# Patient Record
Sex: FEMALE | Race: BLACK OR AFRICAN AMERICAN | Employment: PART TIME | URBAN - METROPOLITAN AREA
[De-identification: names, ages, dates, MRNs, and addresses within clinical notes are randomized per-mention and may not be internally consistent; named-entity substitution may affect disease eponyms.]

---

## 2021-02-25 ENCOUNTER — HOSPITAL ENCOUNTER (EMERGENCY)
Age: 41
Discharge: HOME | End: 2021-02-26
Attending: EMERGENCY MEDICINE
Payer: COMMERCIAL

## 2021-02-25 DIAGNOSIS — D64.9 ANEMIA, UNSPECIFIED TYPE: ICD-10-CM

## 2021-02-25 DIAGNOSIS — R42 DIZZINESS: ICD-10-CM

## 2021-02-25 DIAGNOSIS — R42 LIGHTHEADEDNESS: Primary | ICD-10-CM

## 2021-02-25 LAB
ALBUMIN SERPL-MCNC: 3.8 G/DL (ref 3.5–5)
ALBUMIN/GLOB SERPL: 1.1 {RATIO} (ref 1.1–2.2)
ALP SERPL-CCNC: 45 U/L (ref 45–117)
ALT SERPL-CCNC: 26 U/L (ref 12–78)
ANION GAP SERPL CALC-SCNC: 9 MMOL/L (ref 5–15)
AST SERPL-CCNC: 20 U/L (ref 15–37)
BASOPHILS # BLD: 0 K/UL (ref 0–0.1)
BASOPHILS NFR BLD: 1 % (ref 0–1)
BILIRUB SERPL-MCNC: 0.4 MG/DL (ref 0.2–1)
BUN SERPL-MCNC: 9 MG/DL (ref 6–20)
BUN/CREAT SERPL: 10 (ref 12–20)
CALCIUM SERPL-MCNC: 8.8 MG/DL (ref 8.5–10.1)
CHLORIDE SERPL-SCNC: 106 MMOL/L (ref 97–108)
CO2 SERPL-SCNC: 23 MMOL/L (ref 21–32)
CREAT SERPL-MCNC: 0.94 MG/DL (ref 0.55–1.02)
DIFFERENTIAL METHOD BLD: ABNORMAL
EOSINOPHIL # BLD: 0 K/UL (ref 0–0.4)
EOSINOPHIL NFR BLD: 1 % (ref 0–7)
ERYTHROCYTE [DISTWIDTH] IN BLOOD BY AUTOMATED COUNT: 20.3 % (ref 11.5–14.5)
GLOBULIN SER CALC-MCNC: 3.4 G/DL (ref 2–4)
GLUCOSE SERPL-MCNC: 128 MG/DL (ref 65–100)
HCT VFR BLD AUTO: 29.2 % (ref 35–47)
HGB BLD-MCNC: 9.1 G/DL (ref 11.5–16)
IMM GRANULOCYTES # BLD AUTO: 0 K/UL (ref 0–0.04)
IMM GRANULOCYTES NFR BLD AUTO: 0 % (ref 0–0.5)
LYMPHOCYTES # BLD: 1.1 K/UL (ref 0.8–3.5)
LYMPHOCYTES NFR BLD: 26 % (ref 12–49)
MCH RBC QN AUTO: 22.9 PG (ref 26–34)
MCHC RBC AUTO-ENTMCNC: 31.2 G/DL (ref 30–36.5)
MCV RBC AUTO: 73.4 FL (ref 80–99)
MONOCYTES # BLD: 0.4 K/UL (ref 0–1)
MONOCYTES NFR BLD: 9 % (ref 5–13)
NEUTS SEG # BLD: 2.9 K/UL (ref 1.8–8)
NEUTS SEG NFR BLD: 63 % (ref 32–75)
NRBC # BLD: 0 K/UL (ref 0–0.01)
NRBC BLD-RTO: 0 PER 100 WBC
PLATELET # BLD AUTO: 275 K/UL (ref 150–400)
PMV BLD AUTO: 10.3 FL (ref 8.9–12.9)
POTASSIUM SERPL-SCNC: 3.5 MMOL/L (ref 3.5–5.1)
PROT SERPL-MCNC: 7.2 G/DL (ref 6.4–8.2)
RBC # BLD AUTO: 3.98 M/UL (ref 3.8–5.2)
RBC MORPH BLD: ABNORMAL
SODIUM SERPL-SCNC: 138 MMOL/L (ref 136–145)
TROPONIN I SERPL-MCNC: <0.05 NG/ML
WBC # BLD AUTO: 4.4 K/UL (ref 3.6–11)

## 2021-02-25 PROCEDURE — 36415 COLL VENOUS BLD VENIPUNCTURE: CPT

## 2021-02-25 PROCEDURE — 85025 COMPLETE CBC W/AUTO DIFF WBC: CPT

## 2021-02-25 PROCEDURE — 84484 ASSAY OF TROPONIN QUANT: CPT

## 2021-02-25 PROCEDURE — 93005 ELECTROCARDIOGRAM TRACING: CPT

## 2021-02-25 PROCEDURE — 99284 EMERGENCY DEPT VISIT MOD MDM: CPT

## 2021-02-25 PROCEDURE — 80053 COMPREHEN METABOLIC PANEL: CPT

## 2021-02-25 NOTE — LETTER
[address] 
[facility] EMERGENCY DEPT 
[address] [place] [address] Fortunato Yang [address] 
[phone] Work/School Note Date: 2/25/2021 To Whom It May concern: 
 
Leti Marte was seen and treated today in the emergency room by the following provider(s): 
Attending Provider: Bashir Serra MD 
Physician Assistant: Lilly Watt. Please excuse Leti Marte from work today, February 26th, 2021. Sincerely, Lilly Donnelly

## 2021-02-26 VITALS
SYSTOLIC BLOOD PRESSURE: 115 MMHG | DIASTOLIC BLOOD PRESSURE: 57 MMHG | WEIGHT: 170 LBS | HEIGHT: 67 IN | BODY MASS INDEX: 26.68 KG/M2 | OXYGEN SATURATION: 100 % | TEMPERATURE: 97.9 F | HEART RATE: 64 BPM | RESPIRATION RATE: 18 BRPM

## 2021-02-26 LAB
AMPHET UR QL SCN: NEGATIVE
APPEARANCE UR: CLEAR
ATRIAL RATE: 77 BPM
BACTERIA URNS QL MICRO: NEGATIVE /HPF
BARBITURATES UR QL SCN: NEGATIVE
BENZODIAZ UR QL: NEGATIVE
BILIRUB UR QL: NEGATIVE
CALCULATED P AXIS, ECG09: 80 DEGREES
CALCULATED R AXIS, ECG10: 41 DEGREES
CALCULATED T AXIS, ECG11: 48 DEGREES
CANNABINOIDS UR QL SCN: NEGATIVE
COCAINE UR QL SCN: NEGATIVE
COLOR UR: NORMAL
DIAGNOSIS, 93000: NORMAL
DRUG SCRN COMMENT,DRGCM: NORMAL
EPITH CASTS URNS QL MICRO: NORMAL /LPF
GLUCOSE UR STRIP.AUTO-MCNC: NEGATIVE MG/DL
HCG UR QL: NEGATIVE
HGB UR QL STRIP: NEGATIVE
HYALINE CASTS URNS QL MICRO: NORMAL /LPF (ref 0–5)
KETONES UR QL STRIP.AUTO: NEGATIVE MG/DL
LEUKOCYTE ESTERASE UR QL STRIP.AUTO: NEGATIVE
METHADONE UR QL: NEGATIVE
NITRITE UR QL STRIP.AUTO: NEGATIVE
OPIATES UR QL: NEGATIVE
P-R INTERVAL, ECG05: 146 MS
PCP UR QL: NEGATIVE
PH UR STRIP: 5 [PH] (ref 5–8)
PROT UR STRIP-MCNC: NEGATIVE MG/DL
Q-T INTERVAL, ECG07: 386 MS
QRS DURATION, ECG06: 74 MS
QTC CALCULATION (BEZET), ECG08: 436 MS
RBC #/AREA URNS HPF: NORMAL /HPF (ref 0–5)
SP GR UR REFRACTOMETRY: 1.02 (ref 1–1.03)
UA: UC IF INDICATED,UAUC: NORMAL
UROBILINOGEN UR QL STRIP.AUTO: 0.2 EU/DL (ref 0.2–1)
VENTRICULAR RATE, ECG03: 77 BPM
WBC URNS QL MICRO: NORMAL /HPF (ref 0–4)

## 2021-02-26 PROCEDURE — 80307 DRUG TEST PRSMV CHEM ANLYZR: CPT

## 2021-02-26 PROCEDURE — 81025 URINE PREGNANCY TEST: CPT

## 2021-02-26 PROCEDURE — 81001 URINALYSIS AUTO W/SCOPE: CPT

## 2021-02-26 NOTE — ED NOTES
Discharge information reviewed by PA [facility]. Pt verbalized understanding of discharge instructions. Discharge note signed. Pt discharged without difficulty. Pt discharged in stable condition via ambulation, accompanied by self.

## 2021-02-26 NOTE — ED PROVIDER NOTES
EMERGENCY DEPARTMENT HISTORY AND PHYSICAL EXAM      Date: 2/25/2021  Patient Name: Shante Alvarado    History of Presenting Illness     Chief Complaint   Patient presents with    Dizziness     Pt states that she has been having several episodes of dizziness tonight along with some left arm pain. EMS administered 324 ASA PTA. History Provided By: Patient    HPI: Shante Alvarado, 45 y.o. female without significant PMHx, presents by EMS to the ED with cc of lightheadedness and dizziness. She reports that this evening she was driving her car when she started to feel like she was going to pass out. This caused her to pull into a parking lot. She ate some food and her symptoms resolved. She resumed driving home when they returned and she had to pull off the road again. She was eventually able to get home but notes upon arriving home she felt unsteady and that her heart rate was dropping. Thus, she called the rescue squad to bring her to the ED for evaluation. The patient does report that she has had these symptoms intermittently for some time and is actually scheduled to see a cardiologist this coming Monday to further discuss. She denies headache, nausea, vomiting, diuresis, abdominal pain, diarrhea, constipation, urinary complaint, chance of pregnancy. She does report that she was provided aspirin in route by EMS, which improved her symptoms. She reports that this happened to her one other time and she ended up having a sleep study that was inconclusive. There are no other complaints, changes, or physical findings at this time. Social Hx: Tobacco (denies), EtOH (denies), Illicit drug use (denies)     PCP: Cheryl Handley MD    No current facility-administered medications on file prior to encounter. No current outpatient medications on file prior to encounter. Past History     Past Medical History:  No past medical history on file.     Past Surgical History:  No past surgical history on file.    Family History:  No family history on file. Social History:  Social History     Tobacco Use    Smoking status: Not on file   Substance Use Topics    Alcohol use: Not on file    Drug use: Not on file       Allergies:  No Known Allergies      Review of Systems   Review of Systems   Constitutional: Negative for activity change, appetite change, chills, diaphoresis and fever. HENT: Negative for congestion, ear pain, rhinorrhea and sore throat. Respiratory: Negative for cough and shortness of breath. Cardiovascular: Negative for chest pain. Gastrointestinal: Negative for abdominal pain, constipation, diarrhea, nausea and vomiting. Genitourinary: Negative for difficulty urinating, dysuria, frequency and hematuria. Musculoskeletal: Negative for arthralgias and myalgias. Neurological: Positive for dizziness and light-headedness. Negative for seizures, syncope, weakness and headaches. All other systems reviewed and are negative. Physical Exam   Physical Exam  Vitals signs and nursing note reviewed. Constitutional:       General: She is not in acute distress. Appearance: She is well-developed. She is not diaphoretic. Comments: 45 y.o. -American appearing female    HENT:      Head: Normocephalic and atraumatic. Eyes:      General:         Right eye: No discharge. Left eye: No discharge. Extraocular Movements: Extraocular movements intact. Right eye: No nystagmus. Left eye: No nystagmus. Conjunctiva/sclera: Conjunctivae normal.      Pupils: Pupils are equal, round, and reactive to light. Neck:      Musculoskeletal: Normal range of motion and neck supple. Cardiovascular:      Rate and Rhythm: Normal rate and regular rhythm. Heart sounds: Normal heart sounds. No murmur. Pulmonary:      Effort: Pulmonary effort is normal. No respiratory distress. Breath sounds: Normal breath sounds. Skin:     General: Skin is warm and dry. Neurological:      Mental Status: She is alert and oriented to person, place, and time. Cranial Nerves: Cranial nerves are intact. No cranial nerve deficit or facial asymmetry. Sensory: Sensation is intact. Motor: Motor function is intact. Coordination: Coordination is intact. Gait: Gait is intact. Gait normal.   Psychiatric:         Behavior: Behavior normal.         Diagnostic Study Results     Labs -     Recent Results (from the past 12 hour(s))   EKG, 12 LEAD, INITIAL    Collection Time: 02/25/21  9:46 PM   Result Value Ref Range    Ventricular Rate 77 BPM    Atrial Rate 77 BPM    P-R Interval 146 ms    QRS Duration 74 ms    Q-T Interval 386 ms    QTC Calculation (Bezet) 436 ms    Calculated P Axis 80 degrees    Calculated R Axis 41 degrees    Calculated T Axis 48 degrees    Diagnosis       Normal sinus rhythm  Possible Left atrial enlargement  No previous ECGs available     CBC WITH AUTOMATED DIFF    Collection Time: 02/25/21  9:50 PM   Result Value Ref Range    WBC 4.4 3.6 - 11.0 K/uL    RBC 3.98 3.80 - 5.20 M/uL    HGB 9.1 (L) 11.5 - 16.0 g/dL    HCT 29.2 (L) 35.0 - 47.0 %    MCV 73.4 (L) 80.0 - 99.0 FL    MCH 22.9 (L) 26.0 - 34.0 PG    MCHC 31.2 30.0 - 36.5 g/dL    RDW 20.3 (H) 11.5 - 14.5 %    PLATELET 052 158 - 008 K/uL    MPV 10.3 8.9 - 12.9 FL    NRBC 0.0 0  WBC    ABSOLUTE NRBC 0.00 0.00 - 0.01 K/uL    NEUTROPHILS 63 32 - 75 %    LYMPHOCYTES 26 12 - 49 %    MONOCYTES 9 5 - 13 %    EOSINOPHILS 1 0 - 7 %    BASOPHILS 1 0 - 1 %    IMMATURE GRANULOCYTES 0 0.0 - 0.5 %    ABS. NEUTROPHILS 2.9 1.8 - 8.0 K/UL    ABS. LYMPHOCYTES 1.1 0.8 - 3.5 K/UL    ABS. MONOCYTES 0.4 0.0 - 1.0 K/UL    ABS. EOSINOPHILS 0.0 0.0 - 0.4 K/UL    ABS. BASOPHILS 0.0 0.0 - 0.1 K/UL    ABS. IMM.  GRANS. 0.0 0.00 - 0.04 K/UL    DF SMEAR SCANNED      RBC COMMENTS TARGET CELLS  PRESENT        RBC COMMENTS STOMATOCYTES  PRESENT        RBC COMMENTS ANISOCYTOSIS  1+        RBC COMMENTS MICROCYTOSIS  PRESENT METABOLIC PANEL, COMPREHENSIVE    Collection Time: 02/25/21  9:50 PM   Result Value Ref Range    Sodium 138 136 - 145 mmol/L    Potassium 3.5 3.5 - 5.1 mmol/L    Chloride 106 97 - 108 mmol/L    CO2 23 21 - 32 mmol/L    Anion gap 9 5 - 15 mmol/L    Glucose 128 (H) 65 - 100 mg/dL    BUN 9 6 - 20 MG/DL    Creatinine 0.94 0.55 - 1.02 MG/DL    BUN/Creatinine ratio 10 (L) 12 - 20      GFR est AA >60 >60 ml/min/1.73m2    GFR est non-AA >60 >60 ml/min/1.73m2    Calcium 8.8 8.5 - 10.1 MG/DL    Bilirubin, total 0.4 0.2 - 1.0 MG/DL    ALT (SGPT) 26 12 - 78 U/L    AST (SGOT) 20 15 - 37 U/L    Alk.  phosphatase 45 45 - 117 U/L    Protein, total 7.2 6.4 - 8.2 g/dL    Albumin 3.8 3.5 - 5.0 g/dL    Globulin 3.4 2.0 - 4.0 g/dL    A-G Ratio 1.1 1.1 - 2.2     TROPONIN I    Collection Time: 02/25/21  9:50 PM   Result Value Ref Range    Troponin-I, Qt. <0.05 <0.05 ng/mL   URINALYSIS W/ REFLEX CULTURE    Collection Time: 02/26/21 12:16 AM    Specimen: Urine   Result Value Ref Range    Color YELLOW/STRAW      Appearance CLEAR CLEAR      Specific gravity 1.019 1.003 - 1.030      pH (UA) 5.0 5.0 - 8.0      Protein Negative NEG mg/dL    Glucose Negative NEG mg/dL    Ketone Negative NEG mg/dL    Bilirubin Negative NEG      Blood Negative NEG      Urobilinogen 0.2 0.2 - 1.0 EU/dL    Nitrites Negative NEG      Leukocyte Esterase Negative NEG      WBC 0-4 0 - 4 /hpf    RBC 0-5 0 - 5 /hpf    Epithelial cells FEW FEW /lpf    Bacteria Negative NEG /hpf    UA:UC IF INDICATED CULTURE NOT INDICATED BY UA RESULT CNI      Hyaline cast 0-2 0 - 5 /lpf   DRUG SCREEN, URINE    Collection Time: 02/26/21 12:16 AM   Result Value Ref Range    AMPHETAMINES Negative NEG      BARBITURATES Negative NEG      BENZODIAZEPINES Negative NEG      COCAINE Negative NEG      METHADONE Negative NEG      OPIATES Negative NEG      PCP(PHENCYCLIDINE) Negative NEG      THC (TH-CANNABINOL) Negative NEG      Drug screen comment (NOTE)    HCG URINE, QL    Collection Time: 02/26/21 12:16 AM   Result Value Ref Range    HCG urine, QL Negative NEG       Radiologic Studies - none    Medical Decision Making   I am the first provider for this patient. I reviewed the vital signs, available nursing notes, past medical history, past surgical history, family history and social history. Vital Signs-Reviewed the patient's vital signs. Patient Vitals for the past 12 hrs:   Temp Pulse Resp BP SpO2   02/26/21 0142 97.9 °F (36.6 °C) 64 18 (!) 115/57 100 %   02/25/21 2140 98 °F (36.7 °C) 76 17 109/63 100 %       Records Reviewed: Nursing Notes    Provider Notes (Medical Decision Making): The patient presents to the ED with stable vital signs due to feeling intermittently faint this evening. DDx include UTI, dehydration, hypoglycemia, anemia, electrolyte anbl, renal insufficiency, vertigo, Meniere's disease, drug use/abuse, orthostatic hypotension. ED work up was without acute issue requiring intervention in the ED. The patient is slightly anemic. Discussed with patient taking an over the counter iron supplement, a multi-vitamin with iron and consuming foods high in iron. She should follow up with PCP to have this rechecked. The patient already has a scheduled for cardiology this Monday. She should keep that appointment for further work up and also follow up with her PCP. ED Course:   Initial assessment performed. The patients presenting problems have been discussed, and they are in agreement with the care plan formulated and outlined with them. I have encouraged them to ask questions as they arise throughout their visit. Critical Care Time: None    Disposition:  DISCHARGE NOTE:  2:06 AM  The pt is ready for discharge. The pt's signs, symptoms, diagnosis, and discharge instructions have been discussed and pt has conveyed their understanding. The pt is to follow up as recommended or return to ER should their symptoms worsen. Plan has been discussed and pt is in agreement. PLAN:  1. There are no discharge medications for this patient. 2.   Follow-up Information     Follow up With Specialties Details Why Contact Info    Luz Meraz MD Internal Medicine In 1 week As needed [address] [place] [address]  [phone]      Your Cardiologist   as scheduled this coming Monday         Return to ED if worse     Diagnosis     Clinical Impression:   1. Lightheadedness    2. Dizziness          Please note that this dictation was completed with "[email or web address] the computer voice recognition software. Quite often unanticipated grammatical, syntax, homophones, and other interpretive errors are inadvertently transcribed by the computer software. Please disregards these errors. Please excuse any errors that have escaped final proofreading.

## 2021-03-22 ENCOUNTER — CLINICAL SUPPORT (OUTPATIENT)
Dept: CARDIOLOGY CLINIC | Age: 41
End: 2021-03-22

## 2021-03-22 ENCOUNTER — ANCILLARY PROCEDURE (OUTPATIENT)
Dept: CARDIOLOGY CLINIC | Age: 41
End: 2021-03-22
Payer: COMMERCIAL

## 2021-03-22 VITALS
DIASTOLIC BLOOD PRESSURE: 63 MMHG | HEIGHT: 67 IN | SYSTOLIC BLOOD PRESSURE: 109 MMHG | WEIGHT: 170 LBS | BODY MASS INDEX: 26.68 KG/M2

## 2021-03-22 DIAGNOSIS — R42 DIZZINESS: Primary | ICD-10-CM

## 2021-03-22 DIAGNOSIS — R00.2 PALPITATIONS: ICD-10-CM

## 2021-03-22 PROCEDURE — 93306 TTE W/DOPPLER COMPLETE: CPT | Performed by: INTERNAL MEDICINE

## 2021-03-22 PROCEDURE — 93272 ECG/REVIEW INTERPRET ONLY: CPT | Performed by: INTERNAL MEDICINE

## 2021-03-22 PROCEDURE — 93270 REMOTE 30 DAY ECG REV/REPORT: CPT | Performed by: INTERNAL MEDICINE

## 2021-03-22 NOTE — PROGRESS NOTES
Patient received a 2 week event monitor. Instructions given verbally as well as an instruction sheet. Pt verbalized understanding.     [facility] Event Monitoring

## 2021-03-23 LAB
ECHO AO ASC DIAM: 2.45 CM
ECHO AO ROOT DIAM: 2.3 CM
ECHO AV AREA PEAK VELOCITY: 2.3 CM2
ECHO AV AREA/BSA PEAK VELOCITY: 1.2 CM2/M2
ECHO AV PEAK GRADIENT: 10.26 MMHG
ECHO AV PEAK VELOCITY: 160.19 CM/S
ECHO LA AREA 4C: 17.47 CM2
ECHO LA MAJOR AXIS: 3.06 CM
ECHO LA MINOR AXIS: 1.62 CM
ECHO LA VOL 2C: 58.86 ML (ref 22–52)
ECHO LA VOL 4C: 45.18 ML (ref 22–52)
ECHO LA VOL BP: 55.47 ML (ref 22–52)
ECHO LA VOL/BSA BIPLANE: 29.39 ML/M2 (ref 16–28)
ECHO LA VOLUME INDEX A2C: 31.19 ML/M2 (ref 16–28)
ECHO LA VOLUME INDEX A4C: 23.94 ML/M2 (ref 16–28)
ECHO LV E' LATERAL VELOCITY: 17.56 CM/S
ECHO LV E' SEPTAL VELOCITY: 16.49 CM/S
ECHO LV INTERNAL DIMENSION DIASTOLIC: 5.01 CM (ref 3.9–5.3)
ECHO LV INTERNAL DIMENSION SYSTOLIC: 3.07 CM
ECHO LV IVSD: 0.97 CM (ref 0.6–0.9)
ECHO LV MASS 2D: 169.3 G (ref 67–162)
ECHO LV MASS INDEX 2D: 89.7 G/M2 (ref 43–95)
ECHO LV POSTERIOR WALL DIASTOLIC: 0.92 CM (ref 0.6–0.9)
ECHO LVOT DIAM: 1.98 CM
ECHO LVOT PEAK GRADIENT: 5.7 MMHG
ECHO LVOT PEAK VELOCITY: 119.34 CM/S
ECHO LVOT SV: 77.1 ML
ECHO LVOT VTI: 24.99 CM
ECHO MV A VELOCITY: 49.2 CM/S
ECHO MV AREA PHT: 4.63 CM2
ECHO MV E DECELERATION TIME (DT): 163.69 MS
ECHO MV E VELOCITY: 96.94 CM/S
ECHO MV E/A RATIO: 1.97
ECHO MV E/E' LATERAL: 5.52
ECHO MV E/E' RATIO (AVERAGED): 5.7
ECHO MV E/E' SEPTAL: 5.88
ECHO MV PRESSURE HALF TIME (PHT): 47.47 MS
ECHO RV TAPSE: 2.57 CM (ref 1.5–2)

## 2021-03-24 ENCOUNTER — OFFICE VISIT (OUTPATIENT)
Dept: INTERNAL MEDICINE CLINIC | Age: 41
End: 2021-03-24
Payer: COMMERCIAL

## 2021-03-24 VITALS
RESPIRATION RATE: 19 BRPM | HEIGHT: 67 IN | WEIGHT: 181 LBS | SYSTOLIC BLOOD PRESSURE: 118 MMHG | OXYGEN SATURATION: 97 % | HEART RATE: 80 BPM | TEMPERATURE: 97.8 F | BODY MASS INDEX: 28.41 KG/M2 | DIASTOLIC BLOOD PRESSURE: 72 MMHG

## 2021-03-24 DIAGNOSIS — D50.0 IRON DEFICIENCY ANEMIA DUE TO CHRONIC BLOOD LOSS: ICD-10-CM

## 2021-03-24 DIAGNOSIS — Z76.89 ESTABLISHING CARE WITH NEW DOCTOR, ENCOUNTER FOR: Primary | ICD-10-CM

## 2021-03-24 DIAGNOSIS — N92.0 MENORRHAGIA WITH REGULAR CYCLE: ICD-10-CM

## 2021-03-24 PROCEDURE — 99203 OFFICE O/P NEW LOW 30 MIN: CPT | Performed by: INTERNAL MEDICINE

## 2021-03-24 RX ORDER — ERGOCALCIFEROL 1.25 MG/1
50000 CAPSULE ORAL
COMMUNITY
Start: 2021-03-11

## 2021-03-24 RX ORDER — ZINC GLUCONATE 10 MG
200 LOZENGE ORAL DAILY
COMMUNITY

## 2021-03-24 RX ORDER — BISMUTH SUBSALICYLATE 262 MG
1 TABLET,CHEWABLE ORAL DAILY
COMMUNITY

## 2021-03-24 NOTE — PROGRESS NOTES
Darci Salazar is a [address] [facility] female and presents with Establish Care  . Subjective:    Establish Care    Previous PCP- Dr. Rosa Ritter- labs recently nml    Pt is undergoing w/u for lightheadedness/anemia. Seeing Dr. Krysten Prince @ [facility]. W/u unrevealing. Echo done recently-nml. PMH- anemia-  Lab Results   Component Value Date/Time    WBC 4.4 2021 09:50 PM    HGB 9.1 (L) 2021 09:50 PM    HCT 29.2 (L) 2021 09:50 PM    PLATELET 324  09:50 PM    MCV 73.4 (L) 2021 09:50 PM        ?BENEDICT- tested 2 yrs ago ? mild    HTQ-l-umesowt    SH    FH mother  ~45 seizure disorder   Father alive brain aneurysm repair   9 siblings- healthy    HM  Immunizations UTD  Eye care n/a  Dental care UTD  Pap 3 yrs appt ashley        Review of Systems  Constitutional: negative for fevers, chills, anorexia and weight loss  Eyes:   negative for visual disturbance and irritation  ENT:   negative for tinnitus,sore throat,nasal congestion,ear pains. hoarseness  Respiratory:  negative for cough, hemoptysis, dyspnea,wheezing  CV:   negative for chest pain, palpitations, lower extremity edema  GI:   negative for nausea, vomiting, diarrhea, abdominal pain,melena  Musculoskel: negative for myalgias, arthralgias, back pain, muscle weakness, joint pain  Neurological:  negative for headaches, dizziness, vertigo, memory problems and gait   Behavl/Psych: negative for feelings of anxiety, depression, mood changes    Past Medical History:   Diagnosis Date    Anemia      History reviewed. No pertinent surgical history.   Social History     Socioeconomic History    Marital status: SINGLE     Spouse name: Not on file    Number of children: Not on file    Years of education: Not on file    Highest education level: Not on file   Tobacco Use    Smoking status: Never Smoker    Smokeless tobacco: Never Used   Substance and Sexual Activity    Alcohol use: Never     Frequency: Never    Drug use: Never    Sexual activity: Not Currently     Partners: Male     Birth control/protection: None     Family History   Problem Relation Age of Onset    Seizures Mother     Sleep Apnea Mother        No Known Allergies    Objective:  Visit Vitals  /72   Pulse 80   Temp 97.8 °F (36.6 °C) (Temporal)   Resp 19   Ht 5' 7\" (1.702 m)   Wt 181 lb (82.1 kg)   LMP 03/15/2021 (Exact Date)   SpO2 97%   BMI 28.35 kg/m²     Physical Exam:   General appearance - alert, well appearing, and in no distress  Mental status - alert, oriented to person, place, and time  EYE-EOMI  Neck - supple, no significant adenopathy   Chest - clear to auscultation, no wheezes, rales or rhonchi, symmetric air entry   Heart - normal rate, regular rhythm, normal S1, S2  Abdomen - soft, nontender, nondistended, no masses or organomegaly  Ext-peripheral pulses normal, no pedal edema, no clubbing or cyanosis  Skin-Warm and dry.  no hyperpigmentation, vitiligo, or suspicious lesions  Neuro -alert, oriented, normal speech, no focal findings or movement disorder noted        Results for orders placed or performed in visit on 03/22/21   ECHO ADULT COMPLETE   Result Value Ref Range    IVSd 0.97 (A) 0.60 - 0.90 cm    LVIDd 5.01 3.90 - 5.30 cm    LVIDs 3.07 cm    LVOT d 1.98 cm    LVPWd 0.92 (A) 0.60 - 0.90 cm    LVOT Peak Gradient 5.70 mmHg    LVOT SV 77.1 mL    LVOT Peak Velocity 119.34 cm/s    LVOT VTI 24.99 cm    Left Atrium Major Axis 3.06 cm    LA Volume 55.47 22.0 - 52.0 mL    LA Area 4C 17.47 cm2    LA Vol 2C 58.86 (A) 22.00 - 52.00 mL    LA Vol 4C 45.18 22.00 - 52.00 mL    Aortic Valve Area by Continuity of Peak Velocity 2.30 cm2    AoV PG 10.26 mmHg    Aortic Valve Systolic Peak Velocity 492.09 cm/s    MV A Abdelrahman 49.20 cm/s    Mitral Valve E Wave Deceleration Time 163.69 ms    MV E Abdelrahman 96.94 cm/s    E/E' lateral 5.52     E/E' septal 5.88     LV E' Lateral Velocity 17.56 cm/s    LV E' Septal Velocity 16.49 cm/s    Mitral Valve Pressure Half-time 47.47 ms    MVA (PHT) 4.63 cm2    Tapse 2.57 (A) 1.50 - 2.00 cm    AO ASC D 2.45 cm    Ao Root D 2.30 cm    MV E/A 1.97     LV Mass .3 67.0 - 162.0 g    LV Mass AL Index 89.7 43.0 - 95.0 g/m2    E/E' ratio (averaged) 5.70     Left Atrium Minor Axis 1.62 cm    LA Vol Index 29.39 16.00 - 28.00 ml/m2    LA Vol Index 31.19 16.00 - 28.00 ml/m2    LA Vol Index 23.94 16.00 - 28.00 ml/m2    REBEL/BSA Pk Abdelrahman 1.2 cm2/m2       Assessment/Plan:    ICD-10-CM ICD-9-CM    1. Establishing care with new doctor, encounter for  Z76.89 V65.8    2. Iron deficiency anemia due to chronic blood loss  D50.0 280.0    3. Menorrhagia with regular cycle  N92.0 626.2      Orders Placed This Encounter    ergocalciferol (ERGOCALCIFEROL) 1,250 mcg (50,000 unit) capsule    ferrous sulfate (SLOW FE) 142 mg (45 mg iron) ER tablet     Sig: Take 142 mg by mouth Daily (before breakfast). Indications: anemia from inadequate iron    magnesium 250 mg tab     Sig: Take 200 mg by mouth daily. Indications: low amount of magnesium in the blood    multivitamin (ONE A DAY) tablet     Sig: Take 1 Tab by mouth daily. 1. Establishing care with new doctor, encounter for  Completed    2. Iron deficiency anemia due to chronic blood loss  Cont iron    3. Menorrhagia with regular cycle  F/u gyn    There are no Patient Instructions on file for this visit. Follow-up and Dispositions    · Return in about 1 year (around 3/24/2022) for annual PE. I have reviewed with the patient details of the assessment and plan and all questions were answered. Relevent patient education was performed. The most recent lab findings were reviewed with the patient. An After Visit Summary was printed and given to the patient.

## 2021-03-31 ENCOUNTER — OFFICE VISIT (OUTPATIENT)
Dept: CARDIOLOGY CLINIC | Age: 41
End: 2021-03-31
Payer: COMMERCIAL

## 2021-03-31 VITALS
HEART RATE: 80 BPM | OXYGEN SATURATION: 99 % | BODY MASS INDEX: 28.19 KG/M2 | RESPIRATION RATE: 15 BRPM | HEIGHT: 67 IN | DIASTOLIC BLOOD PRESSURE: 76 MMHG | WEIGHT: 179.6 LBS | SYSTOLIC BLOOD PRESSURE: 100 MMHG

## 2021-03-31 DIAGNOSIS — D50.0 IRON DEFICIENCY ANEMIA DUE TO CHRONIC BLOOD LOSS: ICD-10-CM

## 2021-03-31 DIAGNOSIS — G47.33 OSA (OBSTRUCTIVE SLEEP APNEA): ICD-10-CM

## 2021-03-31 DIAGNOSIS — R55 NEAR SYNCOPE: ICD-10-CM

## 2021-03-31 DIAGNOSIS — R00.2 HEART PALPITATIONS: Primary | ICD-10-CM

## 2021-03-31 PROCEDURE — 93000 ELECTROCARDIOGRAM COMPLETE: CPT | Performed by: INTERNAL MEDICINE

## 2021-03-31 PROCEDURE — 99204 OFFICE O/P NEW MOD 45 MIN: CPT | Performed by: INTERNAL MEDICINE

## 2021-03-31 NOTE — PROGRESS NOTES
1. Have you been to the ER, urgent care clinic since your last visit? Hospitalized since your last visit? Seen on 2/25/21    2. Have you seen or consulted any other health care providers outside of the [address] [place] [address] since your last visit? Include any pap smears or colon screening.    No.        Chief Complaint   Patient presents with    New Patient     heart palps, syncope times 3     Results     monitor results- still wearing the monitor

## 2021-03-31 NOTE — LETTER
3/31/2021 Patient: James Rae YOB: 1980 Date of Visit: 3/31/2021 Christelle Martins MD 
[address] [facility] [id number] [address] [employer] Dear Christelle Martins MD, Thank you for referring Ms. James Rae to [facility] for evaluation. My notes for this consultation are attached. If you have questions, please do not hesitate to call me. I look forward to following your patient along with you. Sincerely, Ruy Garcia MD

## 2021-03-31 NOTE — PROGRESS NOTES
[address] [place] [address]  [phone]     Subjective:      Beth Marrero is a 45 y.o. female is here for new patient consultation. She has pmhx iron deficiency anemia, now being repleted by PCP. Denies hx smoking/etoh/illegal drug use. No family hx CAD. Initially went to the ED in Feb for dizziness, near syncope. Her Hgb then was 9. She was referred to Dr Christine Motta who ordered echo and 2 week event monitor. She also f/u with PCP who started her on Iron replacement. She had labs done last Thursday from her GYN and was informed that her Hgb was up to 10.1    No further near syncope, some occasional lightheadedness, occasional palpitation. She reports hx inconclusive sleep study back in 2018 done by [facility]  And that she notices her palpitation more at night---sometimes awaken her from sleep. She is still wearing her heart monitor. The patient denies chest pain/ shortness of breath, orthopnea, PND, LE edema, syncope. Echo 3/2021  · LV: Estimated LVEF is 60 - 65%. Normal cavity size, wall thickness, systolic function (ejection fraction normal) and diastolic function. Wall motion: normal.         Patient Active Problem List    Diagnosis Date Noted    Menorrhagia with regular cycle 03/24/2021    Iron deficiency anemia due to chronic blood loss 03/24/2021      Kvng Manuel MD  Past Medical History:   Diagnosis Date    Anemia       History reviewed. No pertinent surgical history.   No Known Allergies   Family History   Problem Relation Age of Onset    Seizures Mother     Sleep Apnea Mother       Social History     Socioeconomic History    Marital status: SINGLE     Spouse name: Not on file    Number of children: Not on file    Years of education: Not on file    Highest education level: Not on file   Occupational History    Not on file   Social Needs    Financial resource strain: Not on file    Food insecurity     Worry: Not on file Inability: Not on file    Transportation needs     Medical: Not on file     Non-medical: Not on file   Tobacco Use    Smoking status: Never Smoker    Smokeless tobacco: Never Used   Substance and Sexual Activity    Alcohol use: Never     Frequency: Never    Drug use: Never    Sexual activity: Not Currently     Partners: Male     Birth control/protection: None   Lifestyle    Physical activity     Days per week: Not on file     Minutes per session: Not on file    Stress: Not on file   Relationships    Social connections     Talks on phone: Not on file     Gets together: Not on file     Attends Zoroastrian service: Not on file     Active member of club or organization: Not on file     Attends meetings of clubs or organizations: Not on file     Relationship status: Not on file    Intimate partner violence     Fear of current or ex partner: Not on file     Emotionally abused: Not on file     Physically abused: Not on file     Forced sexual activity: Not on file   Other Topics Concern    Not on file   Social History Narrative    Not on file      Current Outpatient Medications   Medication Sig    ergocalciferol (ERGOCALCIFEROL) 1,250 mcg (50,000 unit) capsule Take 50,000 Units by mouth every seven (7) days.  ferrous sulfate (SLOW FE) 142 mg (45 mg iron) ER tablet Take 142 mg by mouth two (2) times a day. Indications: anemia from inadequate iron    magnesium 250 mg tab Take 200 mg by mouth daily. Indications: low amount of magnesium in the blood    multivitamin (ONE A DAY) tablet Take 1 Tab by mouth daily. No current facility-administered medications for this visit.           Review of Symptoms:  11 systems reviewed, negative other than as stated in the HPI    Physical ExamPhysical Exam:    Vitals:    03/31/21 1016 03/31/21 1032 03/31/21 1033   BP: 118/80 110/80 100/76   Pulse: 64 74 80   Resp: 15     SpO2: 100% 99% 99%   Weight: 179 lb 9.6 oz (81.5 kg)     Height: 5' 7\" (1.702 m)       Body mass index is 28.13 kg/m². General PE  Gen:  NAD  Mental Status - Alert. General Appearance - Not in acute distress. HEENT:  PERRL, no carotid bruits or JVD  Chest and Lung Exam   Inspection: Accessory muscles - No use of accessory muscles in breathing. Auscultation:   Breath sounds: - Normal.   Cardiovascular   Inspection: Jugular vein - Bilateral - Inspection Normal.   Palpation/Percussion:   Apical Impulse: - Normal.   Auscultation: Rhythm - Regular. Heart Sounds - S1 WNL and S2 WNL. No S3 or S4. Murmurs & Other Heart Sounds: Auscultation of the heart reveals - No Murmurs. Peripheral Vascular   Upper Extremity: Inspection - Bilateral - No Cyanotic nailbeds or Digital clubbing. Lower Extremity:   Palpation: Edema - Bilateral - No edema. Abdomen:   Soft, non-tender, bowel sounds are active. Neuro: A&O times 3, CN and motor grossly WNL    Labs:   No results found for: CHOL, CHOLX, CHLST, CHOLV, 922642, HDL, HDLP, LDL, LDLC, DLDLP, TGLX, TRIGL, TRIGP, CHHD, CHHDX  No results found for: CPK, CPKX, CPX  Lab Results   Component Value Date/Time    Sodium 138 02/25/2021 09:50 PM    Potassium 3.5 02/25/2021 09:50 PM    Chloride 106 02/25/2021 09:50 PM    CO2 23 02/25/2021 09:50 PM    Anion gap 9 02/25/2021 09:50 PM    Glucose 128 (H) 02/25/2021 09:50 PM    BUN 9 02/25/2021 09:50 PM    Creatinine 0.94 02/25/2021 09:50 PM    BUN/Creatinine ratio 10 (L) 02/25/2021 09:50 PM    GFR est AA >60 02/25/2021 09:50 PM    GFR est non-AA >60 02/25/2021 09:50 PM    Calcium 8.8 02/25/2021 09:50 PM    Bilirubin, total 0.4 02/25/2021 09:50 PM    Alk. phosphatase 45 02/25/2021 09:50 PM    Protein, total 7.2 02/25/2021 09:50 PM    Albumin 3.8 02/25/2021 09:50 PM    Globulin 3.4 02/25/2021 09:50 PM    A-G Ratio 1.1 02/25/2021 09:50 PM    ALT (SGPT) 26 02/25/2021 09:50 PM       EKG:  SR     Assessment:     Assessment:      1. Heart palpitations    2. Near syncope    3. Iron deficiency anemia due to chronic blood loss    4.  BENEDICT (obstructive sleep apnea)        Orders Placed This Encounter    YON Dillon ref ED [facility]     Referral Priority:   Routine     Referral Type:   Consultation     Referral Reason:   Specialty Services Required     Referred to Provider:   Ria Feliciano MD     Requested Specialty:   Sleep Medicine     Number of Visits Requested:   1    AMB POC EKG ROUTINE W/ 12 LEADS, INTER & REP     Order Specific Question:   Reason for Exam:     Answer:   routine        Plan:     1. Heart palpitations  Echo 3/2021 showed normal EF no significant valve issues  Will refer to Sleep center as she had hx inconclusive sleep study prev and that her palpitation are more pronounced at night  Wait completion of event monitor    2. Pre syncope  No further syncope  Echo and monitor as above  Bp normotensive. 3. Iron deficiency anemia due to chronic blood loss  Now on Iron replacement  Reports most recent Hgb 10.1 obtained last week through her GYN          Continue current care and f/u in       HOME [facility]    3/31/2021         Patient seen, examined by me personally. Plan discussed as detailed. Agree with note as outlined by  NP with modifications as noted. My independent physical exam reveals : Physical Exam   Constitutional: She is oriented to person, place, and time. She appears well-developed and well-nourished. HENT:   Head: Normocephalic. Eyes: Conjunctivae are normal.   Neck: Normal range of motion. Cardiovascular: Normal rate, regular rhythm and normal heart sounds. No murmur heard. Pulmonary/Chest: Effort normal and breath sounds normal. She has no wheezes. Musculoskeletal:         General: No edema. Neurological: She is alert and oriented to person, place, and time. Skin: Skin is warm and dry. Psychiatric: She has a normal mood and affect. Nursing note and vitals reviewed. No additional findings noted. Agree with plan as outlined above with modifications as noted.      Tre Steve Tyler Gomez MD

## 2021-04-13 NOTE — PROGRESS NOTES
Jeanette,    Please call the patient and inform that holter/event monitor is normal.  Baseline normal heart rhythm and heart rates, had some episodes of fast heart rates. Right now, would keep up with sleep evaluation and correction of her anemia.   Can consider medications to slow down the heart rate in the future if symptoms persist.    Thanks,  Augie Kumar

## 2021-04-14 ENCOUNTER — TELEPHONE (OUTPATIENT)
Dept: CARDIOLOGY CLINIC | Age: 41
End: 2021-04-14

## 2021-04-14 NOTE — TELEPHONE ENCOUNTER
----- Message from Jillian Serrato NP sent at 4/13/2021  8:23 AM EDT -----  Elva Alva,    Please call the patient and inform that holter/event monitor is normal.  Baseline normal heart rhythm and heart rates, had some episodes of fast heart rates. Right now, would keep up with sleep evaluation and correction of her anemia.   Can consider medications to slow down the heart rate in the future if symptoms persist.    Thanks,  Christy Peterson

## 2021-12-17 ENCOUNTER — OFFICE VISIT (OUTPATIENT)
Dept: INTERNAL MEDICINE CLINIC | Age: 41
End: 2021-12-17
Payer: COMMERCIAL

## 2021-12-17 VITALS — HEIGHT: 67 IN | WEIGHT: 204 LBS | RESPIRATION RATE: 16 BRPM | BODY MASS INDEX: 32.02 KG/M2

## 2021-12-17 DIAGNOSIS — M22.2X2 PATELLOFEMORAL ARTHRALGIA OF BOTH KNEES: Primary | ICD-10-CM

## 2021-12-17 DIAGNOSIS — M22.2X1 PATELLOFEMORAL ARTHRALGIA OF BOTH KNEES: Primary | ICD-10-CM

## 2021-12-17 PROCEDURE — 99214 OFFICE O/P EST MOD 30 MIN: CPT | Performed by: FAMILY MEDICINE

## 2021-12-17 RX ORDER — NAPROXEN 500 MG/1
500 TABLET ORAL 2 TIMES DAILY WITH MEALS
Qty: 30 TABLET | Refills: 1 | Status: SHIPPED | OUTPATIENT
Start: 2021-12-17

## 2021-12-17 NOTE — PROGRESS NOTES
Chief Complaint   Patient presents with    Knee Pain     she is a 37y.o. year old female who presents for evaluation of both knees  Pain Assessment Encounter      Alex Strickland  12/17/2021  Onset of Symptoms: Patient states she has had pain for 2 months  ________________________________________________________________________  Description:Patient states she was jumping rope and notices swelling in both knees    Frequency: more than 5 times a day  Pain Scale:(1-10): 4  Trauma Hx: none  Hx of similar symptoms: No:   Radiation: NO, knee  Duration:  continuous      Progression: has worsened  What makes it better?: heat and OTC meds  What makes it worse?:exercise  Medications tried: ibuprofen    Reviewed and agree with Nurse Note and duplicated in this note. Reviewed PmHx, RxHx, FmHx, SocHx, AllgHx and updated and dated in the chart. Family History   Problem Relation Age of Onset    Seizures Mother     Sleep Apnea Mother        Past Medical History:   Diagnosis Date    Anemia       Social History     Socioeconomic History    Marital status: SINGLE   Tobacco Use    Smoking status: Never Smoker    Smokeless tobacco: Never Used   Vaping Use    Vaping Use: Never used   Substance and Sexual Activity    Alcohol use: Never    Drug use: Never    Sexual activity: Not Currently     Partners: Male     Birth control/protection: None        Review of Systems - negative except as listed above      Objective:     Vitals:    12/17/21 1047   Resp: 16   Weight: 204 lb (92.5 kg)   Height: 5' 7\" (1.702 m)       Physical Examination: General appearance - alert, well appearing, and in no distress  Back exam - full range of motion, no tenderness, palpable spasm or pain on motion  Neurological - alert, oriented, normal speech, no focal findings or movement disorder noted  Musculoskeletal - bilateral knee exam:  The patient'sbilateral Knee  is  normal to inspection.   The ROM is normal and there is flexion to Normal Effusion is: mild The joint exhibits Negative warmth and Crepitus is: moderate. The Santy test is:  negative Joint Line Tenderness is  positive medial.  The Thessaly Test is negative  and the Lachman is  negative. The Anterior Drawer is negative. The Posterior Drawer is:  negative. Valgus Stress (for MCL) is:  normal . Varus Stress (for LCL) is  normal  . The Price Test is negative and the Apprehension Sign:  negative  Patellar Grind positive and Tracking is lateralizing  Extremities - peripheral pulses normal, no pedal edema, no clubbing or cyanosis  Skin - normal coloration and turgor, no rashes, no suspicious skin lesions noted   Indications for study: right knee pain  Limited musculoskeletal ultrasound examination was performed on the anterior right knee with the following findings: Quadriceps tendon - long:  normal echogenic, linearly compact fibrillar appearance   Quadriceps tendon - trans:  normal echogenic, tessellate compact fibrillar pancake-like appearance   Suprapatellar recess - long: + effusion; normal appearing suprapatellar fat pads   Patellar tendon - long:  normal echogenic, linearly compact fibrillar appearance; normal appearing Hoffa fat pad  Patellar tendon - trans:  normal echogenic, tessellate compact fibrillar pancake-like appearance     Impression: Moderate knee effusion otherwise normal    Scanned and Interpreted by Shahbaz Galan MD [facility]        Assessment/ Plan:   Diagnoses and all orders for this visit:    1. Patellofemoral arthralgia of both knees  -     XR KNEE RT MIN 4 V; Future  -     REFERRAL TO PHYSICAL THERAPY  -     AMB POC US,EXTREMITY,NONVASCULAR,REAL-TIME IMAGE,LIMITED    Other orders  -     naproxen (NAPROSYN) 500 mg tablet; Take 1 Tablet by mouth two (2) times daily (with meals).          Pathophysiology, recovery and rehabilitation process discussed and questions answered   Counseling for 30 Minutes of the total visit duration   Pictures and figures used as necessary Provided reassurance   Monitor response to Physical Therapy   Recommend activity modification   Recommend  lower impact activities-walking, Eliptical, Nordic Track, cycling or swimming   Follow up in 4 week(s)             1) Remember to stay active and/or exercise regularly (I suggest 30-45 minutes daily)   2) For reliable dietary information, go to www. EATRIGHT.org. You may wish to consider seeing the nutritionist at [facility] [phone], also consider the [id number] I have discussed the diagnosis with the patient and the intended plan as seen in the above orders. The patient has received an after-visit summary and questions were answered concerning future plans. Medication Side Effects and Warnings were discussed with patient,  Patient Labs were reviewed and or requested, and  Patient Past Records were reviewed and or requested  yes      Pt agrees to call or return to clinic and/or go to closest ER with any worsening of symptoms. This may include, but not limited to increased fever (>100.4) with NSAIDS or Tylenol, increased edema, confusion, rash, worsening of presenting symptoms. Please note that this dictation was completed with [employer], the computer voice recognition software. Quite often unanticipated grammatical, syntax, homophones, and other interpretive errors are inadvertently transcribed by the computer software. Please disregard these errors. Please excuse any errors that have escaped final proofreading. Thank you.

## 2022-01-05 ENCOUNTER — APPOINTMENT (OUTPATIENT)
Dept: PHYSICAL THERAPY | Age: 42
End: 2022-01-05

## 2022-01-07 ENCOUNTER — HOSPITAL ENCOUNTER (OUTPATIENT)
Dept: PHYSICAL THERAPY | Age: 42
Discharge: HOME | End: 2022-01-07
Payer: COMMERCIAL

## 2022-01-07 PROCEDURE — 97161 PT EVAL LOW COMPLEX 20 MIN: CPT | Performed by: PHYSICAL THERAPIST

## 2022-01-07 NOTE — PROGRESS NOTES
PT INITIAL EVALUATION NOTE 2-15    Patient Name: Chapito Kaye  Date:2022  : 1980  [x]  Patient  Verified  [facility] / Plan: [address] / Product Type: Managed Care Medicaid /    In time:1023a  Out time: 1055a  Total Treatment Time (min): 32  Visit #: 1     Treatment Area: Bilateral knee pain [M25.561, M25.562]    SUBJECTIVE  Pain Level (0-10 scale): 0  Any medication changes, allergies to medications, adverse drug reactions, diagnosis change, or new procedure performed?: [] No    [x] Yes (see summary sheet for update)  Subjective:     Patient reports with bilateral knee pain that began after jumping rope for the first time in about 20 years. Knees were painful for a few days then went away. She tried it again recently in early December, and had the same reaction. She is mainly sticking to the elliptical for exercise right now, but also walks the track and notices that the pain will increase in she is on the treadmill for >1 hour. Notices an increase in knee pain with bend down, going down>up stairs (R>L) knees, standing >1 hour. R knee is worse than L knee. Description of symptoms: R>L knee pain, anterio-inferior region  Aggravating Factors: see above  Alleviating Factors: arnica gel, pillows underneath, heat    OBJECTIVE/EXAMINATION  Posture:  Genu valgum, recurvatum noted  Other Observations:  Squat: forward knee position, pain and crepitus    Palpation: Some TTP over infrapatellar fat pad bilaterally, R patellar tendon    L Knee AROM 5/0/125deg   R Knee AROM 5/0/118deg p!      Joint Mobility Assessment: Patella: lateral tilt bilaterally, patella clary on R     Flexibility: HS flexibility good, Quad flexibility notably tight, R>L sides (~95deg)    LOWER QUARTER   MUSCLE STRENGTH  KEY       R  L  0 - No Contraction  Knee ext  5  5  1 - Trace            flex  5  5  2 - Poor   Hip ext   --  --  3 - Fair          flex   4+  4+  4 - Good abd  3+  4  5 - Normal         ER   4  4+    MMT: see above  Neurological: Reflexes / Sensations: nt  Special Tests: Ralph: (+) bilaterally                    Patellar grind: (+)    5 min Therapeutic Exercise:  [x] See flow sheet :   Rationale: increase ROM and increase strength to improve the patients ability to exercise without pain          With   [] TE   [] TA   [] Neuro   [] SC   [] other: Patient Education: [x] Review HEP    [] Progressed/Changed HEP based on:   [] positioning   [] body mechanics   [] transfers   [] heat/ice application    [] other:        Other Objective/Functional Measures: FOTO Functional Measure: 69/100    Pain Level (0-10 scale) post treatment: 0    ASSESSMENT:      [x]  See Plan of MANAV Sanon, MARYT 1/7/2022

## 2022-01-07 NOTE — PROGRESS NOTES
Physical Therapy at [facility],   a part of [facility]  [address] [address], [address] [place] [address], [address]  Phone: [phone]  Fax: [phone]    Plan of Care/Statement of Necessity for Physical Therapy Services  2-15    Patient name: Matt Rosario  : 1980  Provider#: [id number]  Referral source: José Miguel Leyva MD      Medical/Treatment Diagnosis: Bilateral knee pain [M25.561, M25.562]     Prior Hospitalization: see medical history     Comorbidities: obesity  Prior Level of Function: able to exercise without pain  Medications: Verified on Patient Summary List    Start of Care: 23      Onset Date: 2-3 months ago       The Plan of Care and following information is based on the information from the initial evaluation. Assessment/ key information: Patient reports with bilateral knee pain that began after beginning a jump rope workout. Symptoms consistent with bilateral patello-femoral pain syndrome. Noted excessive tightness of both quads and hip flexors, hip weakness, and patellar mal-alignment.      Evaluation Complexity History MEDIUM  Complexity : 1-2 comorbidities / personal factors will impact the outcome/ POC ; Examination HIGH Complexity : 4+ Standardized tests and measures addressing body structure, function, activity limitation and / or participation in recreation  ;Presentation LOW Complexity : Stable, uncomplicated  ;Clinical Decision Making MEDIUM Complexity : FOTO score of 26-74  Overall Complexity Rating: LOW     Problem List: pain affecting function, decrease ROM, decrease strength, impaired gait/ balance, decrease ADL/ functional abilitiies, decrease activity tolerance and decrease flexibility/ joint mobility   Treatment Plan may include any combination of the following: Therapeutic exercise, Therapeutic activities, Neuromuscular re-education, Physical agent/modality, Manual therapy, Patient education and Self Care training  Patient / Family readiness to learn indicated by: asking questions and trying to perform skills  Persons(s) to be included in education: patient (P)  Barriers to Learning/Limitations: None  Patient Goal (s): strengthen knees to be able to continue exercise  Patient Self Reported Health Status: excellent  Rehabilitation Potential: good    Short Term Goals: To be accomplished in 2-4 treatments:   1. Pt will be compliant with initial HEP and PT attendance  Long Term Goals: To be accomplished in 10-12 treatments:   1. Pt will be able to walk for at least 1 hour without increase in symptoms   2. Pt will be able to perform light jump rope exercises without increase in symptoms   3. Pt will be able to navigate stairs without increase in symptoms   4. Pt will be able to self-manage care using updated HEP for improved independence   5. Improved FOTO score to 75 or better to demonstrate improved function  Frequency / Duration: Patient to be seen 1-2 times per week for 10-12 treatments. Patient/ Caregiver education and instruction: self care and exercises    [x]  Plan of care has been reviewed with EVELYNE Barnett PT, DPT 1/7/2022     ________________________________________________________________________    I certify that the above Therapy Services are being furnished while the patient is under my care. I agree with the treatment plan and certify that this therapy is necessary.     [de-identified] Signature:____________________  Date:____________Time: _________      Florin Petersen MD

## 2022-01-12 ENCOUNTER — HOSPITAL ENCOUNTER (OUTPATIENT)
Dept: PHYSICAL THERAPY | Age: 42
Discharge: HOME | End: 2022-01-12
Payer: COMMERCIAL

## 2022-01-12 PROCEDURE — 97110 THERAPEUTIC EXERCISES: CPT | Performed by: PHYSICAL THERAPIST

## 2022-01-12 PROCEDURE — 97140 MANUAL THERAPY 1/> REGIONS: CPT | Performed by: PHYSICAL THERAPIST

## 2022-01-12 NOTE — PROGRESS NOTES
PT DAILY TREATMENT NOTE 2-15    Patient Name: Laura Suarez  Date:2022  : 1980  [x]  Patient  Verified  [facility] [address] / Plan: [address] / Product Type: Managed Care Medicaid /    In time: 117p  Out time: 207p  Total Treatment Time (min): 50  Visit #: 2    Treatment Area: Bilateral knee pain [M25.561, M25.562]    SUBJECTIVE  Pain Level (0-10 scale): 0  Any medication changes, allergies to medications, adverse drug reactions, diagnosis change, or new procedure performed?: [x] No    [] Yes (see summary sheet for update)  Subjective functional status/changes:   [] No changes reported  Knee felt much better doing the exercises at home. Noticed immediate improvements with stairs after doing the exercises. OBJECTIVE    40 min Therapeutic Exercise:  [x] See flow sheet :   Rationale: increase ROM and increase strength to improve the patients ability to work without pain    10 min Manual Therapy:  Patellar glides, lateral HS stretching (not effective on R), both performed bilaterally. IT band foam roll MFR on R   Rationale: decrease pain, increase ROM, increase tissue extensibility and decrease trigger points  to improve the patients ability to navigate stairs without pain          With   [] TE   [] TA   [] Neuro   [] SC   [] other: Patient Education: [x] Review HEP    [] Progressed/Changed HEP based on:   [] positioning   [] body mechanics   [] transfers   [] heat/ice application    [] other:      Other Objective/Functional Measures: --     Pain Level (0-10 scale) post treatment: 0    ASSESSMENT/Changes in Function:   Did well with exercises today, but was significantly challenged with hip strengthening exercises. Good initial progress.    Patient will continue to benefit from skilled PT services to modify and progress therapeutic interventions, address functional mobility deficits, address ROM deficits, address strength deficits, analyze and address soft tissue restrictions and analyze and cue movement patterns to attain remaining goals. []  See Plan of Care  []  See progress note/recertification  []  See Discharge Summary         Progress towards goals / Updated goals:    Short Term Goals: To be accomplished in 2-4 treatments:               1. Pt will be compliant with initial HEP and PT attendance MET THUS FAR  Long Term Goals:  To be accomplished in 10-12 treatments:               1. Pt will be able to walk for at least 1 hour without increase in symptoms               2. Pt will be able to perform light jump rope exercises without increase in symptoms               3. Pt will be able to navigate stairs without increase in symptoms               4. Pt will be able to self-manage care using updated HEP for improved independence               5. Improved FOTO score to 75 or better to demonstrate improved function    PLAN  [x]  Upgrade activities as tolerated     [x]  Continue plan of care  []  Update interventions per flow sheet       []  Discharge due to:_  []  Other:_      Jonathan Santacruz, PT, DPT 1/12/2022

## 2022-01-14 ENCOUNTER — HOSPITAL ENCOUNTER (OUTPATIENT)
Dept: PHYSICAL THERAPY | Age: 42
Discharge: HOME | End: 2022-01-14
Payer: COMMERCIAL

## 2022-01-14 PROCEDURE — 97110 THERAPEUTIC EXERCISES: CPT | Performed by: PHYSICAL THERAPIST

## 2022-01-14 PROCEDURE — 97140 MANUAL THERAPY 1/> REGIONS: CPT | Performed by: PHYSICAL THERAPIST

## 2022-01-14 NOTE — PROGRESS NOTES
PT DAILY TREATMENT NOTE 2-15    Patient Name: Home Hendricks  Date:2022  : 1980  [x]  Patient  Verified  [facility] [address] [address] / Plan: [address] / Product Type: Managed Care Medicaid /    In time: 1210p  Out time: 100p  Total Treatment Time (min): 50  Visit #:  3    Treatment Area: Bilateral knee pain [M25.561, M25.562]    SUBJECTIVE  Pain Level (0-10 scale): 1  Any medication changes, allergies to medications, adverse drug reactions, diagnosis change, or new procedure performed?: [x] No    [] Yes (see summary sheet for update)  Subjective functional status/changes:   [] No changes reported  Reports she was sore after last visit in her knees, but only lasted about the rest of the day. OBJECTIVE      40 min Therapeutic Exercise:  [x]? See flow sheet :   Rationale: increase ROM and increase strength to improve the patients ability to work without pain     10 min Manual Therapy:  Patellar glides. IT band foam roll MFR bilaterally   Rationale: decrease pain, increase ROM, increase tissue extensibility and decrease trigger points  to improve the patients ability to navigate stairs without pain                                                                 With   []? TE   []? TA   []? Neuro   []? SC   []? other: Patient Education: [x]? Review HEP    []? Progressed/Changed HEP based on:   []? positioning   []? body mechanics   []? transfers   []? heat/ice application    []? other:       Other Objective/Functional Measures: --        Pain Level (0-10 scale) post treatment: 0     ASSESSMENT/Changes in Function:   Able to perform 6\" step up without pain today on each side with verbal cueing for hip ER positioning. Held on leg press today.     Patient will continue to benefit from skilled PT services to modify and progress therapeutic interventions, address functional mobility deficits, address ROM deficits, address strength deficits, analyze and address soft tissue restrictions and analyze and cue movement patterns to attain remaining goals. []? See Plan of Care  []? See progress note/recertification  []? See Discharge Summary         Progress towards goals / Updated goals:     Short Term Goals: To be accomplished in 2-4 treatments:               1. Pt will be compliant with initial HEP and PT attendance MET [address] be accomplished in 10-12 treatments:               1. Pt will be able to walk for at least 1 hour without increase in symptoms               2. Pt will be able to perform light jump rope exercises without increase in symptoms               3. Pt will be able to navigate stairs without increase in symptoms               4. Pt will be able to self-manage care using updated HEP for improved independence               5. Improved FOTO score to 75 or better to demonstrate improved function     PLAN  [x]? Upgrade activities as tolerated     [x]? Continue plan of care  []? Update interventions per flow sheet       []? Discharge due to:_  []?   Other:_        Keysha Calvo, PT, DPT 1/14/2022

## 2022-01-17 ENCOUNTER — APPOINTMENT (OUTPATIENT)
Dept: PHYSICAL THERAPY | Age: 42
End: 2022-01-17
Payer: COMMERCIAL

## 2022-01-19 ENCOUNTER — HOSPITAL ENCOUNTER (OUTPATIENT)
Dept: PHYSICAL THERAPY | Age: 42
Discharge: HOME | End: 2022-01-19
Payer: COMMERCIAL

## 2022-01-19 PROCEDURE — 97110 THERAPEUTIC EXERCISES: CPT | Performed by: PHYSICAL THERAPIST

## 2022-01-19 PROCEDURE — 97140 MANUAL THERAPY 1/> REGIONS: CPT | Performed by: PHYSICAL THERAPIST

## 2022-01-19 NOTE — PROGRESS NOTES
PT DAILY TREATMENT NOTE - [facility] 2-15    Patient Name: Lance Hussein  Date:2022  : 1980  [x]  Patient  Verified  Payor: [address] / Plan: [address] / Product Type: Managed Care Medicaid /    In time: 718E  Out time:405p  Total Treatment Time (min): 50  Visit #: 4    Treatment Area: Bilateral knee pain [M25.561, M25.562]    SUBJECTIVE  Pain Level (0-10 scale): 0  Any medication changes, allergies to medications, adverse drug reactions, diagnosis change, or new procedure performed?: [x] No    [] Yes (see summary sheet for update)  Subjective functional status/changes:   [] No changes reported  Knees are starting to feel better. OBJECTIVE  40 min Therapeutic Exercise:  [x]? ? See flow sheet :   Rationale: increase ROM and increase strength to improve the patients ability to work without pain     10 min Manual Therapy:  Patellar glides. IT band foam roll MFR bilaterally   Rationale: decrease pain, increase ROM, increase tissue extensibility and decrease trigger points  to improve the patients ability to navigate stairs without pain                                                                 With   []?? TE   []?? TA   []? ? Neuro   []?? SC   []?? other: Patient Education: [x]? ? Review HEP    []? ? Progressed/Changed HEP based on:   []?? positioning   []? ? body mechanics   []? ? transfers   []? ? heat/ice application    []? ? other:       Other Objective/Functional Measures: --        Pain Level (0-10 scale) post treatment: 0     ASSESSMENT/Changes in Function:   Added back in leg press and step ups today without pain. Making good progress at this time. Patient will continue to benefit from skilled PT services to modify and progress therapeutic interventions, address functional mobility deficits, address ROM deficits, address strength deficits, analyze and address soft tissue restrictions and analyze and cue movement patterns to attain remaining goals.     []? ?  See Plan of Care  []? ?  See progress note/recertification  []? ?  See Discharge Summary         Progress towards goals / Updated goals:     Short Term Goals: To be accomplished in 2-4 treatments:               1. Pt will be compliant with initial HEP and PT attendance [address] be accomplished in 10-12 treatments:               1. Pt will be able to walk for at least 1 hour without increase in symptoms               2. Pt will be able to perform light jump rope exercises without increase in symptoms               3. Pt will be able to navigate stairs without increase in symptoms               4. Pt will be able to self-manage care using updated HEP for improved independence               5. Improved FOTO score to 75 or better to demonstrate improved function     PLAN  [x]? ?  Upgrade activities as tolerated     [x]? ?  Continue plan of care  []? ?  Update interventions per flow sheet       []? ?  Discharge due to:_  []??  Other:_          Hector Chilel, PT, DPT 1/19/2022

## 2022-01-24 ENCOUNTER — OFFICE VISIT (OUTPATIENT)
Dept: INTERNAL MEDICINE CLINIC | Age: 42
End: 2022-01-24
Payer: COMMERCIAL

## 2022-01-24 VITALS
HEART RATE: 64 BPM | OXYGEN SATURATION: 94 % | WEIGHT: 199.5 LBS | DIASTOLIC BLOOD PRESSURE: 67 MMHG | HEIGHT: 67 IN | BODY MASS INDEX: 31.31 KG/M2 | SYSTOLIC BLOOD PRESSURE: 103 MMHG

## 2022-01-24 DIAGNOSIS — M22.2X2 PATELLOFEMORAL ARTHRALGIA OF BOTH KNEES: Primary | ICD-10-CM

## 2022-01-24 DIAGNOSIS — M22.2X1 PATELLOFEMORAL ARTHRALGIA OF BOTH KNEES: Primary | ICD-10-CM

## 2022-01-24 PROCEDURE — 99214 OFFICE O/P EST MOD 30 MIN: CPT | Performed by: FAMILY MEDICINE

## 2022-01-24 NOTE — PROGRESS NOTES
Chief Complaint   Patient presents with    Knee Pain     bilateral knee pain follow up     she is a 37y.o. year old female who presents for follow up of injury. Follow Up Pain Assessment Encounter      Onset of Symptoms: 4 months  ________________________________________________________________________  Description: Pain is now has improved      Pain Scale:(1-10): 2 currently and 10/10 at its worse  Duration:  continuous  Radiation: none  What makes it better?: heat, sitting, strecthing and pain gel  What makes it worse?:bending knee, going up stairs, certain types of exercises. Medications tried: pain gel  Modalities tried: PT         Reviewed and agree with Nurse Note and duplicated in this note. Reviewed PmHx, RxHx, FmHx, SocHx, AllgHx and updated and dated in the chart.     Family History   Problem Relation Age of Onset    Seizures Mother     Sleep Apnea Mother        Past Medical History:   Diagnosis Date    Anemia       Social History     Socioeconomic History    Marital status: SINGLE   Tobacco Use    Smoking status: Never Smoker    Smokeless tobacco: Never Used   Vaping Use    Vaping Use: Never used   Substance and Sexual Activity    Alcohol use: Never    Drug use: Never    Sexual activity: Not Currently     Partners: Male     Birth control/protection: None        Review of Systems - negative except as listed above      Objective:     Vitals:    01/24/22 1456   BP: 103/67   Pulse: 64   SpO2: 94%   Weight: 199 lb 8 oz (90.5 kg)   Height: 5' 7\" (1.702 m)       Physical Examination: General appearance - alert, well appearing, and in no distress  Eyes - pupils equal and reactive, extraocular eye movements intact  Ears - bilateral TM's and external ear canals normal  Nose - normal and patent, no erythema, discharge or polyps  Mouth - mucous membranes moist, pharynx normal without lesions  Neck - supple, no significant adenopathy  Chest - clear to auscultation, no wheezes, rales or rhonchi, symmetric air entry  Heart - normal rate, regular rhythm, normal S1, S2, no murmurs, rubs, clicks or gallops  Abdomen - soft, nontender, nondistended, no masses or organomegaly  Musculoskeletal - no joint tenderness, deformity or swelling  Extremities - peripheral pulses normal, no pedal edema, no clubbing or cyanosis  Skin - normal coloration and turgor, no rashes, no suspicious skin lesions noted     Assessment/ Plan:   Diagnoses and all orders for this visit:    1. Patellofemoral arthralgia of both knees       At this time patient like to avoid steroid injections  Continue physical therapy and over-the-counter topical treatment such as Voltaren. Follow-up in 3 weeks  Pathophysiology, recovery and rehabilitation process discussed and questions answered   Counseling for 30 Minutes of the total visit duration   Pictures and figures used as necessary   Provided reassurance   Monitor response to injection   Monitor response to Physical Therapy   Recommend activity modification   Recommend  lower impact activities-walking, Eliptical, Nordic Track, cycling or swimming               I have discussed the diagnosis with the patient and the intended plan as seen in the above orders. The patient has received an after-visit summary and questions were answered concerning future plans. Medication Side Effects and Warnings were discussed with patient,  Patient Labs were reviewed and or requested, and  Patient Past Records were reviewed and or requested  yes     Pt agrees to call or return to clinic and/or go to closest ER with any worsening of symptoms. This may include, but not limited to increased fever (>100.4) with NSAIDS or Tylenol, increased edema, confusion, rash, worsening of presenting symptoms. Please note that this dictation was completed with [employer], the [employer] voice recognition software.   Quite often unanticipated grammatical, syntax, homophones, and other interpretive errors are inadvertently transcribed by the computer software. Please disregard these errors. Please excuse any errors that have escaped final proofreading. Thank you.

## 2022-01-26 ENCOUNTER — HOSPITAL ENCOUNTER (OUTPATIENT)
Dept: PHYSICAL THERAPY | Age: 42
Discharge: HOME | End: 2022-01-26
Payer: COMMERCIAL

## 2022-01-26 PROCEDURE — 97110 THERAPEUTIC EXERCISES: CPT | Performed by: PHYSICAL THERAPIST

## 2022-01-26 PROCEDURE — 97140 MANUAL THERAPY 1/> REGIONS: CPT | Performed by: PHYSICAL THERAPIST

## 2022-01-26 NOTE — PROGRESS NOTES
PT DAILY TREATMENT NOTE 2-15    Patient Name: Magdy Weir  Date:2022  : 1980  [x]  Patient  Verified  Payor: [address] / Plan: [address] / Product Type: Managed Care Medicaid /    In time: 771X  Out time: 415p  Total Treatment Time (min): 62  Visit #:  5    Treatment Area: Bilateral knee pain [M25.561, M25.562]    SUBJECTIVE  Pain Level (0-10 scale): 0  Any medication changes, allergies to medications, adverse drug reactions, diagnosis change, or new procedure performed?: [x] No    [] Yes (see summary sheet for update)  Subjective functional status/changes:   [] No changes reported  Doing pretty well today. Overall the knees seem to be improving some, and had less pain last session than the session prior with the squats. OBJECTIVE  43 min Therapeutic Exercise:  [x]? ?? See flow sheet :   Rationale: increase ROM and increase strength to improve the patients ability to work without pain     14 min Manual Therapy:  Patellar glides. IT band foam roll MFR bilaterally   Rationale: decrease pain, increase ROM, increase tissue extensibility and decrease trigger points  to improve the patients ability to navigate stairs without pain                                                                 With   []??? TE   []??? TA   []??? Neuro   []??? SC   []? ?? other: Patient Education: [x]??? Review HEP    []? ?? Progressed/Changed HEP based on:   []??? positioning   []? ?? body mechanics   []? ?? transfers   []? ?? heat/ice application    []? ?? other:       Other Objective/Functional Measures: --        Pain Level (0-10 scale) post treatment: 0     ASSESSMENT/Changes in Function:   Doing well with exercises at this time. Added in hip ER without pain, but seems to be improving.    Patient will continue to benefit from skilled PT services to modify and progress therapeutic interventions, address functional mobility deficits, address ROM deficits, address strength deficits, analyze and address soft tissue restrictions and analyze and cue movement patterns to attain remaining goals.     []? ??  See Plan of Care  []? ??  See progress note/recertification  []? ??  See Discharge Summary         Progress towards goals / Updated goals:     Short Term Goals: To be accomplished in 2-4 treatments:               1. Pt will be compliant with initial HEP and PT attendance [address] be accomplished in 10-12 treatments:               1. Pt will be able to walk for at least 1 hour without increase in symptoms               2. Pt will be able to perform light jump rope exercises without increase in symptoms               3. Pt will be able to navigate stairs without increase in symptoms               4. Pt will be able to self-manage care using updated HEP for improved independence               5. Improved FOTO score to 75 or better to demonstrate improved function     PLAN  [x]???  Upgrade activities as tolerated     [x]? ??  Continue plan of care  []? ??  Update interventions per flow sheet       []???  Discharge due to:_  []???  Other:Alek Trinidad , PT, DPT 1/26/2022

## 2022-01-28 ENCOUNTER — HOSPITAL ENCOUNTER (OUTPATIENT)
Dept: PHYSICAL THERAPY | Age: 42
Discharge: HOME | End: 2022-01-28
Payer: COMMERCIAL

## 2022-01-28 PROCEDURE — 97140 MANUAL THERAPY 1/> REGIONS: CPT | Performed by: PHYSICAL THERAPIST

## 2022-01-28 PROCEDURE — 97110 THERAPEUTIC EXERCISES: CPT | Performed by: PHYSICAL THERAPIST

## 2022-01-28 NOTE — PROGRESS NOTES
PT DAILY TREATMENT NOTE 2-15    Patient Name: Jayashree Alejandre  Date:2022  : 1980  [x]  Patient  Verified  Payor: [address] / Plan: [address] / Product Type: Managed Care Medicaid /    In time: 9974U  Out time: 100p  Total Treatment Time (min): 47  Visit #: 6    Treatment Area: Bilateral knee pain [M25.561, M25.562]    SUBJECTIVE  Pain Level (0-10 scale): 0  Any medication changes, allergies to medications, adverse drug reactions, diagnosis change, or new procedure performed?: [x] No    [] Yes (see summary sheet for update)  Subjective functional status/changes:   [] No changes reported  Doing fine today. No knee pain after last visit. OBJECTIVE  38 min Therapeutic Exercise:  [x]? ??? See flow sheet :   Rationale: increase ROM and increase strength to improve the patients ability to work without pain     9 min Manual Therapy:  Patellar glides. IT band foam roll MFR bilaterally   Rationale: decrease pain, increase ROM, increase tissue extensibility and decrease trigger points  to improve the patients ability to navigate stairs without pain                                                                 With   []???? TE   []???? TA   []???? Neuro   []???? SC   []???? other: Patient Education: [x]???? Review HEP    []???? Progressed/Changed HEP based on:   []???? positioning   []???? body mechanics   []???? transfers   []???? heat/ice application    []???? other:       Other Objective/Functional Measures: --        Pain Level (0-10 scale) post treatment: 0     ASSESSMENT/Changes in Function:   Added valgus pull for step up and this improved her form. Modified this for her to work on at home.    Patient will continue to benefit from skilled PT services to modify and progress therapeutic interventions, address functional mobility deficits, address ROM deficits, address strength deficits, analyze and address soft tissue restrictions and analyze and cue movement patterns to attain remaining goals.     []????  See Plan of Care  []????  See progress note/recertification  []????  See Discharge Summary         Progress towards goals / Updated goals:     Short Term Goals: To be accomplished in 2-4 treatments:               1. Pt will be compliant with initial HEP and PT attendance 75% MET  Long Term Goals: To be accomplished in 10-12 treatments:               1. Pt will be able to walk for at least 1 hour without increase in symptoms               2. Pt will be able to perform light jump rope exercises without increase in symptoms               3. Pt will be able to navigate stairs without increase in symptoms               4. Pt will be able to self-manage care using updated HEP for improved independence               5. Improved FOTO score to 75 or better to demonstrate improved function     PLAN  [x]????  Upgrade activities as tolerated     [x]? ???  Continue plan of care  []????  Update interventions per flow sheet       []????  Discharge due to:_  []????  Other:_        Topher Deluna, PT, DPT 1/28/2022

## 2022-02-07 ENCOUNTER — HOSPITAL ENCOUNTER (OUTPATIENT)
Dept: PHYSICAL THERAPY | Age: 42
Discharge: HOME | End: 2022-02-07
Payer: COMMERCIAL

## 2022-02-07 PROCEDURE — 97140 MANUAL THERAPY 1/> REGIONS: CPT | Performed by: PHYSICAL THERAPIST

## 2022-02-07 PROCEDURE — 97110 THERAPEUTIC EXERCISES: CPT | Performed by: PHYSICAL THERAPIST

## 2022-02-07 NOTE — PROGRESS NOTES
PT DAILY TREATMENT NOTE 2-15    Patient Name: Joe Cervantes  MPXM:[id number]  : 1980  [x]  Patient  Verified  Payor: [address] / Plan: [address] / Product Type: Managed Care Medicaid /    In time: 402p  Out time: 500p  Total Treatment Time (min): 58  Visit #: 7    Treatment Area: Bilateral knee pain [M25.561, M25.562]     SUBJECTIVE  Pain Level (0-10 scale): 0  Any medication changes, allergies to medications, adverse drug reactions, diagnosis change, or new procedure performed?: [x] No    [] Yes (see summary sheet for update)  Subjective functional status/changes:   [] No changes reported  80% recovered at this time. OBJECTIVE    48 min Therapeutic Exercise:  [x]?????? See flow sheet :   Rationale: increase ROM and increase strength to improve the patients ability to work without pain     10 min Manual Therapy:  Patellar glides. IT band foam roll MFR bilaterally   Rationale: decrease pain, increase ROM, increase tissue extensibility and decrease trigger points  to improve the patients ability to navigate stairs without pain                                                                 With   []?????? TE   []?????? TA   []?????? Neuro   []?????? SC   []?????? other: Patient Education: [x]?????? Review HEP    []?????? Progressed/Changed HEP based on:   []?????? positioning   []?????? body mechanics   []?????? transfers   []?????? heat/ice application    []?????? other:       Other Objective/Functional Measures: FOTO: 75        Pain Level (0-10 scale) post treatment: 0     ASSESSMENT/Changes in Function:   []??????  See Plan of Care  [x]??????  See progress note/recertification  []??????  See Discharge Summary         Progress towards goals / Updated goals:     Short Term Goals: To be accomplished in 2-4 treatments:               1.  Pt will be compliant with initial HEP and PT attendance MET  Long Term Goals: To be accomplished in 10-12 treatments:               1. Pt will be able to walk for at least 1 hour without increase in symptoms MET               2. Pt will be able to perform light jump rope exercises without increase in symptoms NOT YET ATTEMPTED               3. Pt will be able to navigate stairs without increase in symptoms 75% MET               4. Pt will be able to self-manage care using updated HEP for improved independence MET               5.  Improved FOTO score to 75 or better to demonstrate improved function MET     PLAN  [x]??????  Upgrade activities as tolerated     [x]??????  Continue plan of care  []??????  Update interventions per flow sheet       []??????  Discharge due to:_  []??????  Other:_        Rogerio Gillk, PT, DPT 2/7/2022

## 2022-02-09 ENCOUNTER — HOSPITAL ENCOUNTER (OUTPATIENT)
Dept: PHYSICAL THERAPY | Age: 42
Discharge: HOME | End: 2022-02-09
Payer: COMMERCIAL

## 2022-02-09 PROCEDURE — 97110 THERAPEUTIC EXERCISES: CPT | Performed by: PHYSICAL THERAPIST

## 2022-02-09 PROCEDURE — 97140 MANUAL THERAPY 1/> REGIONS: CPT | Performed by: PHYSICAL THERAPIST

## 2022-02-09 NOTE — PROGRESS NOTES
PT DAILY TREATMENT NOTE 2-15    Patient Name: Chapito Kaye  Date:2022  : 1980  [x]  Patient  Verified  Payor: [address] [place] [address] Road / Plan: [address] / Product Type: Managed Care Medicaid /    In time: 450p  Out time: 539p  Total Treatment Time (min): 49  Visit #:  8    Treatment Area: Bilateral knee pain [M25.561, M25.562]    SUBJECTIVE  Pain Level (0-10 scale): 0  Any medication changes, allergies to medications, adverse drug reactions, diagnosis change, or new procedure performed?: [x] No    [] Yes (see summary sheet for update)  Subjective functional status/changes:   [] No changes reported  Doing well this week. No pain right now. OBJECTIVE     39 min Therapeutic Exercise:  [x]??????? See flow sheet :   Rationale: increase ROM and increase strength to improve the patients ability to work without pain     10 min Manual Therapy:  Patellar glides. IT band foam roll MFR bilaterally   Rationale: decrease pain, increase ROM, increase tissue extensibility and decrease trigger points  to improve the patients ability to navigate stairs without pain                                                                 With   []??????? TE   []??????? TA   []??????? Neuro   []??????? SC   []??????? other: Patient Education: [x]??????? Review HEP    []??????? Progressed/Changed HEP based on:   []??????? positioning   []??????? body mechanics   []??????? transfers   []??????? heat/ice application    []??????? other:       Other Objective/Functional Measures: --       Pain Level (0-10 scale) post treatment: 0    ASSESSMENT/Changes in Function:   Doing pretty well with jump rope. Needed green band to help limit valgus pull. She demonstrated excess anterior knee positioning with jumping, and when this was corrected, she had no pain.   Patient will continue to benefit from skilled PT services to modify and progress therapeutic interventions, address functional mobility deficits, address ROM deficits, address strength deficits, analyze and address soft tissue restrictions and analyze and cue movement patterns to attain remaining goals. []  See Plan of Care  []  See progress note/recertification  []  See Discharge Summary         Progress towards goals / Updated goals:  Short Term Goals: To be accomplished in 2-4 treatments:               1. Pt will be compliant with initial HEP and PT attendance MET  Long Term Goals: To be accomplished in 10-12 treatments:               1. Pt will be able to walk for at least 1 hour without increase in symptoms MET               2. Pt will be able to perform light jump rope exercises without increase in symptoms NOT YET ATTEMPTED               3. Pt will be able to navigate stairs without increase in symptoms 75% MET               4. Pt will be able to self-manage care using updated HEP for improved independence MET               5.  Improved FOTO score to 75 or better to demonstrate improved function MET    PLAN  [x]  Upgrade activities as tolerated     [x]  Continue plan of care  []  Update interventions per flow sheet       []  Discharge due to:_  []  Other:_      Keysha Calvo, PT, DPT 2/9/2022

## 2022-02-14 ENCOUNTER — HOSPITAL ENCOUNTER (OUTPATIENT)
Dept: PHYSICAL THERAPY | Age: 42
Discharge: HOME | End: 2022-02-14
Payer: COMMERCIAL

## 2022-02-14 PROCEDURE — 97016 VASOPNEUMATIC DEVICE THERAPY: CPT | Performed by: PHYSICAL THERAPIST

## 2022-02-14 PROCEDURE — 97110 THERAPEUTIC EXERCISES: CPT | Performed by: PHYSICAL THERAPIST

## 2022-02-14 PROCEDURE — 97140 MANUAL THERAPY 1/> REGIONS: CPT | Performed by: PHYSICAL THERAPIST

## 2022-02-14 NOTE — PROGRESS NOTES
PT DAILY TREATMENT NOTE 2-15    Patient Name: Chelsie Menchaca  Date:2022  : 1980  [x]  Patient  Verified  Payor: [facility] [address] / Plan: [address] / Product Type: Managed Care Medicaid /    In time: 400p  Out time: 505p  Total Treatment Time (min): 65  Visit #: 9    Treatment Area: Bilateral knee pain [M25.561, M25.562]    SUBJECTIVE  Pain Level (0-10 scale): 3  Any medication changes, allergies to medications, adverse drug reactions, diagnosis change, or new procedure performed?: [x] No    [] Yes (see summary sheet for update)  Subjective functional status/changes:   [] No changes reported  Was painful the day after last PT session. Pain has lingered since. She also was having some swelling, but not as bad as it was when symptoms started last year.      OBJECTIVE  Modality rationale: decrease edema, decrease inflammation and decrease pain to improve the patients ability to jump rope without pain   Min Type Additional Details       [] Estim: []Att   []Unatt    []TENS instruct                  []IFC  []Premod   []NMES                     []Other:  []w/US   []w/ice   []w/heat  Position:  Location:       []  Traction: [] Cervical       []Lumbar                       [] Prone          []Supine                       []Intermittent   []Continuous Lbs:  [] before manual  [] after manual  []w/heat    []  Ultrasound: []Continuous   [] Pulsed                       at: []1MHz   []3MHz Location:  W/cm2:    [] Paraffin         Location:   []w/heat    []  Ice     []  Heat  []  Ice massage Position:  Location:    []  Laser  []  Other: Position:  Location:     10 [x]  Vasopneumatic Device Pressure:       [] lo [x] med [] hi   Temperature: 34deg     [x] Skin assessment post-treatment:  [x]intact []redness- no adverse reaction    []redness  adverse reaction:     45 min Therapeutic Exercise:  [x]???????? See flow sheet :   Rationale: increase ROM and increase strength to improve the patients ability to work without pain     10 min Manual Therapy:  Patellar glides R. IT band foam roll MFR bilaterally   Rationale: decrease pain, increase ROM, increase tissue extensibility and decrease trigger points  to improve the patients ability to navigate stairs without pain                                                                 With   []???????? TE   []???????? TA   []???????? Neuro   []???????? SC   []???????? other: Patient Education: [x]???????? Review HEP    []???????? Progressed/Changed HEP based on:   []???????? positioning   []???????? body mechanics   []???????? transfers   []???????? heat/ice application    []???????? other:       Other Objective/Functional Measures: --       Pain Level (0-10 scale) post treatment: 0     ASSESSMENT/Changes in Function:   Responded well to vasopneumatic compression today. No pain on TG squats, and will slowly try to re-integrate light plyometrics. Patient will continue to benefit from skilled PT services to modify and progress therapeutic interventions, address functional mobility deficits, address ROM deficits, address strength deficits, analyze and address soft tissue restrictions and analyze and cue movement patterns to attain remaining goals. []? See Plan of Care  []? See progress note/recertification  []? See Discharge Summary         Progress towards goals / Updated goals:  Short Term Goals: To be accomplished in 2-4 treatments:               1. Pt will be compliant with initial HEP and PT attendance MET  Long Term Goals: To be accomplished in 10-12 treatments:               1.  Pt will be able to walk for at least 1 hour without increase in symptoms MET               2. Pt will be able to perform light jump rope exercises without increase in symptoms NOT YET ATTEMPTED               3. Pt will be able to navigate stairs without increase in symptoms 75% MET               4. Pt will be able to self-manage care using updated HEP for improved independence MET               5. Improved FOTO score to 75 or better to demonstrate improved function MET     PLAN  [x]? Upgrade activities as tolerated     [x]? Continue plan of care  []? Update interventions per flow sheet       []? Discharge due to:_  []?   Other:_         Issac Dillard, PT, DPT 2/14/2022

## 2022-02-15 ENCOUNTER — OFFICE VISIT (OUTPATIENT)
Dept: INTERNAL MEDICINE CLINIC | Age: 42
End: 2022-02-15
Payer: COMMERCIAL

## 2022-02-15 VITALS
HEIGHT: 67 IN | BODY MASS INDEX: 30.76 KG/M2 | OXYGEN SATURATION: 98 % | RESPIRATION RATE: 16 BRPM | HEART RATE: 70 BPM | TEMPERATURE: 98 F | WEIGHT: 196 LBS | DIASTOLIC BLOOD PRESSURE: 81 MMHG | SYSTOLIC BLOOD PRESSURE: 123 MMHG

## 2022-02-15 DIAGNOSIS — G89.29 CHRONIC PAIN OF RIGHT KNEE: Primary | ICD-10-CM

## 2022-02-15 DIAGNOSIS — M25.561 CHRONIC PAIN OF RIGHT KNEE: Primary | ICD-10-CM

## 2022-02-15 DIAGNOSIS — M22.2X2 PATELLOFEMORAL ARTHRALGIA OF BOTH KNEES: ICD-10-CM

## 2022-02-15 DIAGNOSIS — M22.2X1 PATELLOFEMORAL ARTHRALGIA OF BOTH KNEES: ICD-10-CM

## 2022-02-15 PROCEDURE — 99214 OFFICE O/P EST MOD 30 MIN: CPT | Performed by: FAMILY MEDICINE

## 2022-02-15 NOTE — PROGRESS NOTES
Chief Complaint   Patient presents with    Knee Pain     Patient is here for a follow up of both knees. she is a 37y.o. year old female who presents for follow up of injury. Follow Up Pain Assessment Encounter      Onset of Symptoms: Patient states she has had pain for months   ________________________________________________________________________  Description: Pain is now  is unchanged      Pain Scale:(1-10): 5  Duration:  continuous  Radiation: knee  What makes it better?: heat and OTC meds  What makes it worse?:exercise, strecthing and walking  Medications tried: ibuprofen  Modalities tried: PT         Reviewed and agree with Nurse Note and duplicated in this note. Reviewed PmHx, RxHx, FmHx, SocHx, AllgHx and updated and dated in the chart. Family History   Problem Relation Age of Onset    Seizures Mother     Sleep Apnea Mother        Past Medical History:   Diagnosis Date    Anemia       Social History     Socioeconomic History    Marital status: SINGLE   Tobacco Use    Smoking status: Never Smoker    Smokeless tobacco: Never Used   Vaping Use    Vaping Use: Never used   Substance and Sexual Activity    Alcohol use: Never    Drug use: Never    Sexual activity: Not Currently     Partners: Male     Birth control/protection: None        Review of Systems - negative except as listed above      Objective:     Vitals:    02/15/22 1608   BP: 123/81   Pulse: 70   Resp: 16   Temp: 98 °F (36.7 °C)   SpO2: 98%   Weight: 196 lb (88.9 kg)   Height: 5' 7\" (1.702 m)       Physical Examination: General appearance - alert, well appearing, and in no distress  Back exam - full range of motion, no tenderness, palpable spasm or pain on motion  Neurological - alert, oriented, normal speech, no focal findings or movement disorder noted  Musculoskeletal - bilateral knee exam:  The patient'sbilateral Knee  is  normal to inspection.   The ROM is normal and there is flexion to Normal Effusion is: mild The joint exhibits Negative warmth and Crepitus is: moderate. The Santy test is:  negative Joint Line Tenderness is  positive medial.  The Thessaly Test is negative  and the Lachman is  negative. The Anterior Drawer is negative. The Posterior Drawer is:  negative. Valgus Stress (for MCL) is:  normal . Varus Stress (for LCL) is  normal  . The Price Test is negative and the Apprehension Sign:  negative  Patellar Grind positive and Tracking is lateralizing  Extremities - peripheral pulses normal, no pedal edema, no clubbing or cyanosis  Skin - normal coloration and turgor, no rashes, no suspicious skin lesions noted      Assessment/ Plan:   Diagnoses and all orders for this visit:    1. Chronic pain of right knee  -     MRI KNEE RT WO CONT; Future    2. Patellofemoral arthralgia of both knees    MRI due to failure therapy with worsening symptoms on right knee, concern for OCD lesion  Possible therapies are steroid injection versus HA injection versus PRP    Pathophysiology, recovery and rehabilitation process discussed and questions answered   Counseling for 30 Minutes of the total visit duration   Pictures and figures used as necessary   Provided reassurance   Monitor response to Physical Therapy   Recommend activity modification   Recommend  lower impact activities-walking, Eliptical, Nordic Track, cycling or swimming               I have discussed the diagnosis with the patient and the intended plan as seen in the above orders. The patient has received an after-visit summary and questions were answered concerning future plans. Medication Side Effects and Warnings were discussed with patient,  Patient Labs were reviewed and or requested, and  Patient Past Records were reviewed and or requested  yes     Pt agrees to call or return to clinic and/or go to closest ER with any worsening of symptoms.   This may include, but not limited to increased fever (>100.4) with NSAIDS or Tylenol, increased edema, confusion, rash, worsening of presenting symptoms. Please note that this dictation was completed with "[employer] the computer voice recognition software. Quite often unanticipated grammatical, syntax, homophones, and other interpretive errors are inadvertently transcribed by the computer software. Please disregard these errors. Please excuse any errors that have escaped final proofreading. Thank you.

## 2022-02-16 ENCOUNTER — HOSPITAL ENCOUNTER (OUTPATIENT)
Dept: PHYSICAL THERAPY | Age: 42
Discharge: HOME | End: 2022-02-16
Payer: COMMERCIAL

## 2022-02-16 PROCEDURE — 97016 VASOPNEUMATIC DEVICE THERAPY: CPT | Performed by: PHYSICAL THERAPIST

## 2022-02-16 PROCEDURE — 97110 THERAPEUTIC EXERCISES: CPT | Performed by: PHYSICAL THERAPIST

## 2022-02-16 PROCEDURE — 97140 MANUAL THERAPY 1/> REGIONS: CPT | Performed by: PHYSICAL THERAPIST

## 2022-02-16 NOTE — PROGRESS NOTES
PT DAILY TREATMENT NOTE 2-15    Patient Name: Abraham Mendez  Date:2022  : 1980  [x]  Patient  Verified  Payor: [address] [place] [address] Road / Plan: [address] / Product Type: Managed Care Medicaid /    In time: 267P  Out time: 509p  Total Treatment Time (min): 68  Visit #:  10    Treatment Area: Bilateral knee pain [M25.561, M25.562]    SUBJECTIVE  Pain Level (0-10 scale): 2  Any medication changes, allergies to medications, adverse drug reactions, diagnosis change, or new procedure performed?: [x] No    [] Yes (see summary sheet for update)  Subjective functional status/changes:   [] No changes reported  Knee has been doing better since last visit. Ice felt good on it. Saw Dr. Adriana Dhillon and is planning on getting an MRI for the knee. OBJECTIVE         Modality rationale: decrease edema, decrease inflammation and decrease pain to improve the patients ability to jump rope without pain    Min Type Additional Details        []? Estim: []? Att   []? Unatt    []? TENS instruct                  []?IFC  []? Premod   []? NMES                     []?Other:  []?w/US   []?w/ice   []?w/heat  Position:  Location:        []? Traction: []? Cervical       []? Lumbar                       []? Prone          []? Supine                       []?Intermittent   []? Continuous Lbs:  []? before manual  []? after manual  []?w/heat     []? Ultrasound: []? Continuous   []? Pulsed                       at: []?1MHz   []? 3MHz Location:  W/cm2:     []? Paraffin         Location:   []?w/heat     []? Ice     []? Heat  []? Ice massage Position:  Location:     []? Laser  []? Other: Position:  Location:   10 [x]? Vasopneumatic Device Pressure:       []? lo [x]? med []? hi   Temperature: 34deg      [x]? Skin assessment post-treatment:  [x]? intact []? redness- no adverse reaction    []? redness  adverse reaction:      48 min Therapeutic Exercise:  [x]????????? See flow sheet :   Rationale: increase ROM and increase strength to improve the patients ability to work without pain     10 min Manual Therapy:  Patellar glides R. IT band foam roll MFR bilaterally   Rationale: decrease pain, increase ROM, increase tissue extensibility and decrease trigger points  to improve the patients ability to navigate stairs without pain                                                                 With   []????????? TE   []????????? TA   []????????? Neuro   []????????? SC   []????????? other: Patient Education: [x]????????? Review HEP    []????????? Progressed/Changed HEP based on:   []????????? positioning   []????????? body mechanics   []????????? transfers   []????????? heat/ice application    []????????? other:       Other Objective/Functional Measures: --       Pain Level (0-10 scale) post treatment: 0     ASSESSMENT/Changes in Function:   Able to perform TG single limb squats and trampoline hops today without pain. Will continue with 2 more sessions prior to MRI. Patient will continue to benefit from skilled PT services to modify and progress therapeutic interventions, address functional mobility deficits, address ROM deficits, address strength deficits, analyze and address soft tissue restrictions and analyze and cue movement patterns to attain remaining goals.     []? ?  See Plan of Care  []? ?  See progress note/recertification  []? ?  See Discharge Summary         Progress towards goals / Updated goals:  Short Term Goals: To be accomplished in 2-4 treatments:               1. Pt will be compliant with initial HEP and PT attendance MET  Long Term Goals: To be accomplished in 10-12 treatments:               1.  Pt will be able to walk for at least 1 hour without increase in symptoms MET               2. Pt will be able to perform light jump rope exercises without increase in symptoms NOT YET ATTEMPTED               3. Pt will be able to navigate stairs without increase in symptoms 75% MET               4. Pt will be able to self-manage care using updated HEP for improved independence MET               5. Improved FOTO score to 75 or better to demonstrate improved function MET     PLAN  [x]? ?  Upgrade activities as tolerated     [x]? ?  Continue plan of care  []? ?  Update interventions per flow sheet       []? ?  Discharge due to:_  []??  Other:_         Morenita Marcial, PT, DPT 2/16/2022

## 2022-02-21 ENCOUNTER — OFFICE VISIT (OUTPATIENT)
Dept: INTERNAL MEDICINE CLINIC | Age: 42
End: 2022-02-21
Payer: COMMERCIAL

## 2022-02-21 VITALS
HEART RATE: 63 BPM | TEMPERATURE: 98.2 F | WEIGHT: 197.4 LBS | BODY MASS INDEX: 30.98 KG/M2 | HEIGHT: 67 IN | SYSTOLIC BLOOD PRESSURE: 104 MMHG | RESPIRATION RATE: 20 BRPM | OXYGEN SATURATION: 100 % | DIASTOLIC BLOOD PRESSURE: 70 MMHG

## 2022-02-21 DIAGNOSIS — E66.9 OBESITY (BMI 30.0-34.9): ICD-10-CM

## 2022-02-21 DIAGNOSIS — Z00.00 PHYSICAL EXAM: Primary | ICD-10-CM

## 2022-02-21 PROCEDURE — 99396 PREV VISIT EST AGE 40-64: CPT | Performed by: INTERNAL MEDICINE

## 2022-02-21 NOTE — PROGRESS NOTES
Matt Rosario is a 37 y.o. female    Chief Complaint   Patient presents with    Establish Care     1. Have you been to the ER, urgent care clinic since your last visit? Hospitalized since your last visit? No      2. Have you seen or consulted any other health care providers outside of the [address] [place] [address] since your last visit? Include any pap smears or colon screening.   No

## 2022-02-22 LAB
ALBUMIN SERPL-MCNC: 4 G/DL (ref 3.5–5)
ALBUMIN/GLOB SERPL: 1.4 {RATIO} (ref 1.1–2.2)
ALP SERPL-CCNC: 49 U/L (ref 45–117)
ALT SERPL-CCNC: 18 U/L (ref 12–78)
ANION GAP SERPL CALC-SCNC: 2 MMOL/L (ref 5–15)
APPEARANCE UR: CLEAR
AST SERPL-CCNC: 15 U/L (ref 15–37)
BASOPHILS # BLD: 0 K/UL (ref 0–0.1)
BASOPHILS NFR BLD: 0 % (ref 0–1)
BILIRUB SERPL-MCNC: 0.4 MG/DL (ref 0.2–1)
BILIRUB UR QL: NEGATIVE
BUN SERPL-MCNC: 12 MG/DL (ref 6–20)
BUN/CREAT SERPL: 16 (ref 12–20)
CALCIUM SERPL-MCNC: 9.1 MG/DL (ref 8.5–10.1)
CHLORIDE SERPL-SCNC: 107 MMOL/L (ref 97–108)
CHOLEST SERPL-MCNC: 173 MG/DL
CO2 SERPL-SCNC: 31 MMOL/L (ref 21–32)
COLOR UR: ABNORMAL
CREAT SERPL-MCNC: 0.75 MG/DL (ref 0.55–1.02)
DIFFERENTIAL METHOD BLD: NORMAL
EOSINOPHIL # BLD: 0.2 K/UL (ref 0–0.4)
EOSINOPHIL NFR BLD: 4 % (ref 0–7)
ERYTHROCYTE [DISTWIDTH] IN BLOOD BY AUTOMATED COUNT: 13.5 % (ref 11.5–14.5)
GLOBULIN SER CALC-MCNC: 2.9 G/DL (ref 2–4)
GLUCOSE SERPL-MCNC: 93 MG/DL (ref 65–100)
GLUCOSE UR STRIP.AUTO-MCNC: NEGATIVE MG/DL
HCT VFR BLD AUTO: 37.8 % (ref 35–47)
HDLC SERPL-MCNC: 54 MG/DL
HDLC SERPL: 3.2 {RATIO} (ref 0–5)
HGB BLD-MCNC: 12.6 G/DL (ref 11.5–16)
HGB UR QL STRIP: NEGATIVE
IMM GRANULOCYTES # BLD AUTO: 0 K/UL (ref 0–0.04)
IMM GRANULOCYTES NFR BLD AUTO: 0 % (ref 0–0.5)
KETONES UR QL STRIP.AUTO: NEGATIVE MG/DL
LDLC SERPL CALC-MCNC: 89.6 MG/DL (ref 0–100)
LEUKOCYTE ESTERASE UR QL STRIP.AUTO: NEGATIVE
LYMPHOCYTES # BLD: 1.5 K/UL (ref 0.8–3.5)
LYMPHOCYTES NFR BLD: 37 % (ref 12–49)
MCH RBC QN AUTO: 29.3 PG (ref 26–34)
MCHC RBC AUTO-ENTMCNC: 33.3 G/DL (ref 30–36.5)
MCV RBC AUTO: 87.9 FL (ref 80–99)
MONOCYTES # BLD: 0.3 K/UL (ref 0–1)
MONOCYTES NFR BLD: 8 % (ref 5–13)
NEUTS SEG # BLD: 2.1 K/UL (ref 1.8–8)
NEUTS SEG NFR BLD: 51 % (ref 32–75)
NITRITE UR QL STRIP.AUTO: NEGATIVE
NRBC # BLD: 0 K/UL (ref 0–0.01)
NRBC BLD-RTO: 0 PER 100 WBC
PH UR STRIP: 8.5 [PH] (ref 5–8)
PLATELET # BLD AUTO: 222 K/UL (ref 150–400)
PMV BLD AUTO: 11.4 FL (ref 8.9–12.9)
POTASSIUM SERPL-SCNC: 3.8 MMOL/L (ref 3.5–5.1)
PROT SERPL-MCNC: 6.9 G/DL (ref 6.4–8.2)
PROT UR STRIP-MCNC: NEGATIVE MG/DL
RBC # BLD AUTO: 4.3 M/UL (ref 3.8–5.2)
SODIUM SERPL-SCNC: 140 MMOL/L (ref 136–145)
SP GR UR REFRACTOMETRY: 1.01 (ref 1–1.03)
TRIGL SERPL-MCNC: 147 MG/DL (ref ?–150)
UROBILINOGEN UR QL STRIP.AUTO: 0.2 EU/DL (ref 0.2–1)
VLDLC SERPL CALC-MCNC: 29.4 MG/DL
WBC # BLD AUTO: 4.1 K/UL (ref 3.6–11)

## 2022-02-23 ENCOUNTER — APPOINTMENT (OUTPATIENT)
Dept: PHYSICAL THERAPY | Age: 42
End: 2022-02-23
Payer: COMMERCIAL

## 2022-03-02 ENCOUNTER — HOSPITAL ENCOUNTER (OUTPATIENT)
Dept: PHYSICAL THERAPY | Age: 42
Discharge: HOME | End: 2022-03-02
Payer: COMMERCIAL

## 2022-03-02 PROCEDURE — 97016 VASOPNEUMATIC DEVICE THERAPY: CPT | Performed by: PHYSICAL THERAPIST

## 2022-03-02 PROCEDURE — 97110 THERAPEUTIC EXERCISES: CPT | Performed by: PHYSICAL THERAPIST

## 2022-03-02 PROCEDURE — 97140 MANUAL THERAPY 1/> REGIONS: CPT | Performed by: PHYSICAL THERAPIST

## 2022-03-02 NOTE — PROGRESS NOTES
Physical Therapy at [facility],   a part of [facility]  [address] [address], [address] [place] [address], [address] [place] [address]  Phone: [phone]  Fax: [phone]    Medicaid Discharge Summary  2-15    Patient name: Joe Cervantes  : 1980  Provider#: [id number]  Referral source: Arleth Do MD      Medical/Treatment Diagnosis: Bilateral knee pain [M25.561, M25.562]     Prior Hospitalization: see medical history     Comorbidities: obesity  Prior Level of Function: able to exercise without pain  Medications: Verified on Patient Summary List     Start of Care: 22                                                                       Onset Date: 2-3 months ago         Visits from Start of Care: 11    Missed Visits: 2  Reporting Period : 22 to 3/2/22      ASSESSMENT/SUMMARY OF CARE: Ms. Shannan Jacobo has made good progress over the course of 11 sessions treating her R>L knee pain. She has made good progress during that time, and is now able to navigate a single flight of stairs without discomfort. She still has not been able to perform full jump roping exercises without pain, but otherwise is able to exercise. Will attain MRI to check for possible any osteochondral defects, and will proceed with independent management of care at this time. Short Term Goals: To be accomplished in 2-4 treatments:               1. Pt will be compliant with initial HEP and PT attendance MET  Long Term Goals: To be accomplished in 10-12 treatments:               1. Pt will be able to walk for at least 1 hour without increase in symptoms MET               2. Pt will be able to perform light jump rope exercises without increase in symptoms PARTIAL PROGRESS               3. Pt will be able to navigate stairs without increase in symptoms MET               4. Pt will be able to self-manage care using updated HEP for improved independence MET               5.  Improved FOTO score to 75 or better to demonstrate improved function MET    RECOMMENDATIONS:  [x]Discontinue therapy: [x]Patient has reached or is progressing toward set goals/end of insurance authorization      []Patient is non-compliant or has abdicated      []Due to lack of appreciable progress towards set [address] Perkins PT, DPT 3/2/2022     ______________________________________________________________________    NOTE TO PHYSICIAN:  Please complete the following and fax to:  Physical Therapy at [facility], a part of [facility]: Fax: [phone] . Rene Hernandez Retain this original for your records. If you are unable to process this request in 24 hours, please contact our office.      Physician's Signature:____________________  Date:____________Time:_________           Shilpa Anderson MD

## 2022-03-02 NOTE — PROGRESS NOTES
PT DAILY TREATMENT NOTE 2-15    Patient Name: Amanda Class  Date:3/2/2022  : 1980  [x]  Patient  Ver[facility]  [facility] [address] / Plan: [address] / Product Type: Managed Care Medicaid /    In time: 341J  Out time: 415p  Total Treatment Time (min): 58  Visit #:  11    Treatment Area: Bilateral knee pain [M25.561, M25.562]    SUBJECTIVE  Pain Level (0-10 scale): 0  Any medication changes, allergies to medications, adverse drug reactions, diagnosis change, or new procedure performed?: [x] No    [] Yes (see summary sheet for update)  Subjective functional status/changes:   [] No changes reported  Knee has been doing well since last visit. Has been doing most everything except her jump rope. OBJECTIVE              Modality rationale: decrease edema, decrease inflammation and decrease pain to improve the patients ability to jump rope without pain     Min Type Additional Details        []? ? Estim: []??Att   []? ? Unatt    []? ?TENS instruct                  []? ?IFC  []? ?Premod   []? ?NMES                     []? ? Other:  []??w/US   []? ?w/ice   []? ?w/heat  Position:  Location:        []? ?  Traction: []?? Cervical       []? ?Lumbar                       []? ? Prone          []? ?Supine                       []? ?Intermittent   []? ? Continuous Lbs:  []?? before manual  []? ? after manual  []? ?w/heat     []? ?  Ultrasound: []??Continuous   []? ? Pulsed                       OC: []??1MHz   []? ? 3MHz Location:  W/cm2:     []? ? Paraffin         Location:   []??w/heat     []? ?  Ice     []? ?  Heat  []? ?  Ice massage Position:  Location:     []? ?  Laser  []? ?  Other: Position:  Location:   10 [x]? ?  Vasopneumatic Device Pressure:       []? ? lo [x]? ? med []?? hi   Temperature: 34deg      [x]? ? Skin assessment post-treatment:  [x]? ? intact []? ?redness- no adverse reaction    []? ?redness - adverse reaction:      38 min Therapeutic Exercise:  [x]?????????? See flow sheet :   Rationale: increase ROM and increase strength to improve the patients ability to work without pain     10 min Manual Therapy:  Patellar glides R, Mayorga tape applied to improve patellar mechanics   Rationale: decrease pain, increase ROM, increase tissue extensibility and decrease trigger points  to improve the patients ability to navigate stairs without pain                                                                 With   []?????????? TE   []?????????? TA   []?????????? Neuro   []?????????? SC   []?????????? other: Patient Education: [x]?????????? Review HEP    []?????????? Progressed/Changed HEP based on:   []?????????? positioning   []?????????? body mechanics   []?????????? transfers   []?????????? heat/ice application    []?????????? other:       Other Objective/Functional Measures: --       Pain Level (0-10 scale) post treatment: 0     ASSESSMENT/Changes in Function:   Did well with jump rope training, but still moves into a forward knee position. Advised her to perform wall hops in the interim, while working on pushing through her calves on the jump. Gave her info regarding purchasing leukotape and cover-roll tape to perform self-Mayorga tape. Patient will continue to benefit from skilled PT services to modify and progress therapeutic interventions, address functional mobility deficits, address ROM deficits, address strength deficits, analyze and address soft tissue restrictions and analyze and cue movement patterns to attain remaining goals.     []? ??  See Plan of Care  []? ??  See progress note/recertification  []? ??  See Discharge Summary         Progress towards goals / Updated goals:  Short Term Goals: To be accomplished in 2-4 treatments:               1. Pt will be compliant with initial HEP and PT attendance MET  Long Term Goals: To be accomplished in 10-12 treatments:               1.  Pt will be able to walk for at least 1 hour without increase in symptoms MET               2. Pt will be able to perform light jump rope exercises without increase in symptoms PARTIAL PROGRESS               3. Pt will be able to navigate stairs without increase in symptoms MET               4. Pt will be able to self-manage care using updated HEP for improved independence MET               5. Improved FOTO score to 75 or better to demonstrate improved function MET     PLAN  []???  Upgrade activities as tolerated     []? ??  Continue plan of care  []? ??  Update interventions per flow sheet       [x]? ??  Discharge due to:_  []???  Other:_       eHctor Chilel, PT, DPT 3/2/2022

## 2022-03-04 ENCOUNTER — HOSPITAL ENCOUNTER (OUTPATIENT)
Dept: MRI IMAGING | Age: 42
Discharge: HOME | End: 2022-03-04
Attending: FAMILY MEDICINE
Payer: COMMERCIAL

## 2022-03-04 DIAGNOSIS — G89.29 CHRONIC PAIN OF RIGHT KNEE: ICD-10-CM

## 2022-03-04 DIAGNOSIS — M25.561 CHRONIC PAIN OF RIGHT KNEE: ICD-10-CM

## 2022-03-04 PROCEDURE — 73721 MRI JNT OF LWR EXTRE W/O DYE: CPT

## 2022-03-05 NOTE — PROGRESS NOTES
Findings consistent with diagnosis we have discussed in clinic. Recommend cont physical therapy.   If in pain we can try a steroid injection

## 2022-03-06 NOTE — PROGRESS NOTES
[facility]  [place] [address]  [address] [place] [address]  Phone:  [phone]  Fax: [phone]       Chief Complaint   Patient presents with   [address]   . SUBJECTIVE:    Jason Koenig is a 37 y.o. female comes in to have her physical examination. She offers no immediate complaints. She has seen Dr. Bartolo Milan for joint problems particularly in her right knee. She most recently went to the emergency room for anemia which is presumably related to menorrhagia created by uterine fibroid. She is actively followed by Gynecology. Current Outpatient Medications   Medication Sig Dispense Refill    ergocalciferol (ERGOCALCIFEROL) 1,250 mcg (50,000 unit) capsule Take 50,000 Units by mouth every seven (7) days.  ferrous sulfate (SLOW FE) 142 mg (45 mg iron) ER tablet Take 142 mg by mouth two (2) times a day. Indications: anemia from inadequate iron      magnesium 250 mg tab Take 200 mg by mouth daily. Indications: low amount of magnesium in the blood      multivitamin (ONE A DAY) tablet Take 1 Tab by mouth daily.  naproxen (NAPROSYN) 500 mg tablet Take 1 Tablet by mouth two (2) times daily (with meals).  (Patient not taking: Reported on 2022) 30 Tablet 1     Past Medical History:   Diagnosis Date    Anemia      Past Surgical History:   Procedure Laterality Date    HX GYN          HX ORTHOPAEDIC      Left wrist surgery     No Known Allergies      REVIEW OF SYSTEMS:  General: negative for - chills or fever  ENT: negative for - headaches, nasal congestion or tinnitus  Respiratory: negative for - cough, hemoptysis, shortness of breath or wheezing  Cardiovascular : negative for - chest pain, edema, palpitations or shortness of breath  Gastrointestinal: negative for - abdominal pain, blood in stools, heartburn or nausea/vomiting  Genito-Urinary: no dysuria, trouble voiding, or hematuria  Musculoskeletal: negative for - gait disturbance, joint pain, joint stiffness or joint swelling  Neurological: no TIA or stroke symptoms  Hematologic: no bruises, no bleeding, no swollen glands  Integument: no lumps, mole changes, nail changes or rash  Endocrine: no malaise/lethargy or unexpected weight changes      Social History     Socioeconomic History    Marital status: SINGLE    Number of children: 1    Years of education: 14+ all   Occupational History    Occupation: LPN   Tobacco Use    Smoking status: Never Smoker    Smokeless tobacco: Never Used   Vaping Use    Vaping Use: Never used   Substance and Sexual Activity    Alcohol use: Never    Drug use: Never    Sexual activity: Not Currently     Partners: Male     Birth control/protection: None     Family History   Problem Relation Age of Onset    Seizures Mother     Sleep Apnea Mother        OBJECTIVE:    Visit Vitals  /70 (BP 1 Location: Left upper arm, BP Patient Position: Semi fowlers)   Pulse 63   Temp 98.2 °F (36.8 °C) (Oral)   Resp 20   Ht 5' 7\" (1.702 m)   Wt 197 lb 6.4 oz (89.5 kg)   LMP 02/11/2022 (Exact Date)   SpO2 100%   BMI 30.92 kg/m²     CONSTITUTIONAL: well , well nourished, appears age appropriate  EYES: perrla, eom intact  ENMT:moist mucous membranes, pharynx clear  NECK: supple. Thyroid normal  RESPIRATORY: Chest: clear to ascultation and percussion   CARDIOVASCULAR: Heart: regular rate and rhythm  GASTROINTESTINAL: Abdomen: soft, bowel sounds active  HEMATOLOGIC: no pathological lymph nodes palpated  MUSCULOSKELETAL: Extremities: no edema, pulse 1+   INTEGUMENT: No unusual rashes or suspicious skin lesions noted. Nails appear normal.  NEUROLOGIC: non-focal exam   MENTAL STATUS: alert and oriented, appropriate affect      ASSESSMENT:  1. Physical exam    2. Obesity (BMI 30.0-34. 9)          PLAN:  1. The patient is basically healthy. 2. She will follow up with Dr. Sterling Cadena for her musculoskeletal problems. 3. She does need to lose weight.   This can be accomplished by eating meals, eliminating snacks, and avoiding the consumption of processed carbohydrates. .  Orders Placed This Encounter    CBC WITH AUTOMATED DIFF    LIPID PANEL    METABOLIC PANEL, COMPREHENSIVE    URINALYSIS W/ RFLX MICROSCOPIC         Follow-up and Dispositions    · Return in about 1 year (around 2/21/2023).            Manoj Caba MD

## 2022-03-15 ENCOUNTER — OFFICE VISIT (OUTPATIENT)
Dept: INTERNAL MEDICINE CLINIC | Age: 42
End: 2022-03-15
Payer: COMMERCIAL

## 2022-03-15 VITALS
OXYGEN SATURATION: 98 % | TEMPERATURE: 98 F | RESPIRATION RATE: 16 BRPM | HEART RATE: 61 BPM | HEIGHT: 67 IN | WEIGHT: 194 LBS | SYSTOLIC BLOOD PRESSURE: 110 MMHG | BODY MASS INDEX: 30.45 KG/M2 | DIASTOLIC BLOOD PRESSURE: 71 MMHG

## 2022-03-15 DIAGNOSIS — M22.2X1 PATELLOFEMORAL ARTHRALGIA OF BOTH KNEES: Primary | ICD-10-CM

## 2022-03-15 DIAGNOSIS — M22.2X2 PATELLOFEMORAL ARTHRALGIA OF BOTH KNEES: Primary | ICD-10-CM

## 2022-03-15 PROCEDURE — 99213 OFFICE O/P EST LOW 20 MIN: CPT | Performed by: FAMILY MEDICINE

## 2022-03-15 NOTE — PROGRESS NOTES
Chief Complaint   Patient presents with    Knee Pain     Patiguerline is here for right knee pain      she is a 37y.o. year old female who presents for follow up of injury. Follow Up Pain Assessment Encounter      Onset of Symptoms: Patient states she has had knee pain for months   ________________________________________________________________________  Description: Pain is now  has worsened      Pain Scale:(1-10): 1  Duration:  intermittent  Radiation: knee  What makes it better?: exercise  What makes it worse?:walking  Medications tried: ibuprofen  Modalities tried: PT        Reviewed and agree with Nurse Note and duplicated in this note. Reviewed PmHx, RxHx, FmHx, SocHx, AllgHx and updated and dated in the chart. Family History   Problem Relation Age of Onset    Seizures Mother     Sleep Apnea Mother        Past Medical History:   Diagnosis Date    Anemia       Social History     Socioeconomic History    Marital status: SINGLE    Number of children: 1    Years of education: 14+ all   Occupational History    Occupation: LPN   Tobacco Use    Smoking status: Never Smoker    Smokeless tobacco: Never Used   Vaping Use    Vaping Use: Never used   Substance and Sexual Activity    Alcohol use: Never    Drug use: Never    Sexual activity: Not Currently     Partners: Male     Birth control/protection: None        Review of Systems - negative except as listed above      Objective:     Vitals:    03/15/22 1557   BP: 110/71   Pulse: 61   Resp: 16   Temp: 98 °F (36.7 °C)   SpO2: 98%   Weight: 194 lb (88 kg)   Height: 5' 7\" (1.702 m)       Physical Examination:  General appearance - alert, well appearing, and in no distress  Back exam - full range of motion, no tenderness, palpable spasm or pain on motion  Neurological - alert, oriented, normal speech, no focal findings or movement disorder noted  Musculoskeletal - bilateral knee exam:  The patient'sbilateral Knee  is  normal to inspection.   The ROM is normal and there is flexion to Normal Effusion is: mild The joint exhibits Negative warmth and Crepitus is: moderate. The Santy test is:  negative Joint Line Tenderness is  positive medial.  The Thessaly Test is negative  and the Lachman is  negative. The Anterior Drawer is negative. The Posterior Drawer is:  negative. Valgus Stress (for MCL) is:  normal . Varus Stress (for LCL) is  normal  . The Price Test is negative and the Apprehension Sign:  negative  Patellar Grind positive and Tracking is lateralizing  Extremities - peripheral pulses normal, no pedal edema, no clubbing or cyanosis  Skin - normal coloration and turgor, no rashes, no suspicious skin lesions noted       Assessment/ Plan:   Diagnoses and all orders for this visit:    1. Patellofemoral arthralgia of both knees       Resolving, continue with home exercises    Pathophysiology, recovery and rehabilitation process discussed and questions answered   Counseling for 30 Minutes of the total visit duration   Pictures and figures used as necessary   Provided reassurance   Monitor response to home exercises   Recommend activity modification   Recommend  lower impact activities-walking, Eliptical, Nordic Track, cycling or swimming   Follow up in 4 week(s)              I have discussed the diagnosis with the patient and the intended plan as seen in the above orders. The patient has received an after-visit summary and questions were answered concerning future plans. Medication Side Effects and Warnings were discussed with patient,  Patient Labs were reviewed and or requested, and  Patient Past Records were reviewed and or requested  yes     Pt agrees to call or return to clinic and/or go to closest ER with any worsening of symptoms. This may include, but not limited to increased fever (>100.4) with NSAIDS or Tylenol, increased edema, confusion, rash, worsening of presenting symptoms.     Please note that this dictation was completed with [employer], the [employer] voice recognition software. Quite often unanticipated grammatical, syntax, homophones, and other interpretive errors are inadvertently transcribed by the computer software. Please disregard these errors. Please excuse any errors that have escaped final proofreading. Thank you.

## 2022-03-18 PROBLEM — N92.0 MENORRHAGIA WITH REGULAR CYCLE: Status: ACTIVE | Noted: 2021-03-24

## 2022-03-20 PROBLEM — D50.0 IRON DEFICIENCY ANEMIA DUE TO CHRONIC BLOOD LOSS: Status: ACTIVE | Noted: 2021-03-24

## 2022-10-23 NOTE — PROGRESS NOTES
Physical Therapy at [facility],   a part of [facility], [address] [place] [address], [address]  Phone: [phone] Fax: [phone]    Progress Note    Name: Lena Wood   : 1980   MD: Ary Mujica MD       Treatment Diagnosis: Bilateral knee pain [M25.561, M25.562]  Start of Care:  22    Visits from Start of Care: 7  Missed Visits: 1    Summary of Care: Ms. Nick Ayon has been making great progress thus far through 7 PT sessions addressing her bilateral knee pain. She reports she is 80% recovered at this time, and has been able to tolerate stairs without significant issue. Her main remaining limitation would be getting back to jumping rope without difficulty, and will work into this over our remaining sessions. Short Term Goals: To be accomplished in 2-4 treatments:               1. Pt will be compliant with initial HEP and PT attendance MET  Long Term Goals: To be accomplished in 10-12 treatments:               1. Pt will be able to walk for at least 1 hour without increase in symptoms MET               2. Pt will be able to perform light jump rope exercises without increase in symptoms NOT YET ATTEMPTED               3. Pt will be able to navigate stairs without increase in symptoms 75% MET               4. Pt will be able to self-manage care using updated HEP for improved independence MET               5. Improved FOTO score to 75 or better to demonstrate improved function MET    Assessment / Recommendations: Other: Continue with PT x3 more planned sessions, then will likely d/c to HEP only.      Fred Mcmanus, PT, DPT 2022 full range of motion in all extremities

## 2024-01-06 ENCOUNTER — APPOINTMENT (OUTPATIENT)
Facility: HOSPITAL | Age: 44
End: 2024-01-06
Payer: COMMERCIAL

## 2024-01-06 ENCOUNTER — HOSPITAL ENCOUNTER (EMERGENCY)
Facility: HOSPITAL | Age: 44
Discharge: HOME | End: 2024-01-06
Attending: EMERGENCY MEDICINE
Payer: COMMERCIAL

## 2024-01-06 VITALS
OXYGEN SATURATION: 96 % | DIASTOLIC BLOOD PRESSURE: 79 MMHG | SYSTOLIC BLOOD PRESSURE: 133 MMHG | WEIGHT: 206 LBS | BODY MASS INDEX: 32.33 KG/M2 | HEIGHT: 67 IN | RESPIRATION RATE: 18 BRPM | TEMPERATURE: 97.7 F | HEART RATE: 82 BPM

## 2024-01-06 DIAGNOSIS — R10.13 RECURRENT EPIGASTRIC ABDOMINAL PAIN: Primary | ICD-10-CM

## 2024-01-06 DIAGNOSIS — R20.2 PARESTHESIAS IN RIGHT HAND: ICD-10-CM

## 2024-01-06 LAB
ALBUMIN SERPL-MCNC: 4 G/DL (ref 3.5–5)
ALBUMIN/GLOB SERPL: 1.1 (ref 1.1–2.2)
ALP SERPL-CCNC: 50 U/L (ref 45–117)
ALT SERPL-CCNC: 18 U/L (ref 12–78)
ANION GAP SERPL CALC-SCNC: 7 MMOL/L (ref 5–15)
APPEARANCE UR: CLEAR
AST SERPL-CCNC: 20 U/L (ref 15–37)
BACTERIA URNS QL MICRO: NEGATIVE /HPF
BASOPHILS # BLD: 0 K/UL (ref 0–0.1)
BASOPHILS NFR BLD: 0 % (ref 0–1)
BILIRUB SERPL-MCNC: 0.6 MG/DL (ref 0.2–1)
BILIRUB UR QL: NEGATIVE
BUN SERPL-MCNC: 9 MG/DL (ref 6–20)
BUN/CREAT SERPL: 8 (ref 12–20)
CALCIUM SERPL-MCNC: 9.1 MG/DL (ref 8.5–10.1)
CHLORIDE SERPL-SCNC: 106 MMOL/L (ref 97–108)
CO2 SERPL-SCNC: 23 MMOL/L (ref 21–32)
COLOR UR: NORMAL
CREAT SERPL-MCNC: 1.13 MG/DL (ref 0.55–1.02)
DIFFERENTIAL METHOD BLD: ABNORMAL
EKG ATRIAL RATE: 112 BPM
EKG DIAGNOSIS: NORMAL
EKG P AXIS: 73 DEGREES
EKG P-R INTERVAL: 88 MS
EKG Q-T INTERVAL: 474 MS
EKG QRS DURATION: 80 MS
EKG QTC CALCULATION (BAZETT): 647 MS
EKG R AXIS: 70 DEGREES
EKG T AXIS: 65 DEGREES
EKG VENTRICULAR RATE: 112 BPM
EOSINOPHIL # BLD: 0.1 K/UL (ref 0–0.4)
EOSINOPHIL NFR BLD: 1 % (ref 0–7)
EPITH CASTS URNS QL MICRO: NORMAL /LPF
ERYTHROCYTE [DISTWIDTH] IN BLOOD BY AUTOMATED COUNT: 14.6 % (ref 11.5–14.5)
GLOBULIN SER CALC-MCNC: 3.5 G/DL (ref 2–4)
GLUCOSE SERPL-MCNC: 155 MG/DL (ref 65–100)
GLUCOSE UR STRIP.AUTO-MCNC: NEGATIVE MG/DL
HCG UR QL: NEGATIVE
HCT VFR BLD AUTO: 37.4 % (ref 35–47)
HGB BLD-MCNC: 13.1 G/DL (ref 11.5–16)
HGB UR QL STRIP: NEGATIVE
IMM GRANULOCYTES # BLD AUTO: 0 K/UL (ref 0–0.04)
IMM GRANULOCYTES NFR BLD AUTO: 0 % (ref 0–0.5)
KETONES UR QL STRIP.AUTO: NEGATIVE MG/DL
LEUKOCYTE ESTERASE UR QL STRIP.AUTO: NEGATIVE
LIPASE SERPL-CCNC: 27 U/L (ref 13–75)
LYMPHOCYTES # BLD: 1.6 K/UL (ref 0.8–3.5)
LYMPHOCYTES NFR BLD: 30 % (ref 12–49)
MCH RBC QN AUTO: 28.5 PG (ref 26–34)
MCHC RBC AUTO-ENTMCNC: 35 G/DL (ref 30–36.5)
MCV RBC AUTO: 81.5 FL (ref 80–99)
MONOCYTES # BLD: 0.4 K/UL (ref 0–1)
MONOCYTES NFR BLD: 7 % (ref 5–13)
NEUTS SEG # BLD: 3.3 K/UL (ref 1.8–8)
NEUTS SEG NFR BLD: 62 % (ref 32–75)
NITRITE UR QL STRIP.AUTO: NEGATIVE
NRBC # BLD: 0 K/UL (ref 0–0.01)
NRBC BLD-RTO: 0 PER 100 WBC
PH UR STRIP: 7.5 (ref 5–8)
PLATELET # BLD AUTO: 265 K/UL (ref 150–400)
PMV BLD AUTO: 10.1 FL (ref 8.9–12.9)
POTASSIUM SERPL-SCNC: 3.5 MMOL/L (ref 3.5–5.1)
PROT SERPL-MCNC: 7.5 G/DL (ref 6.4–8.2)
PROT UR STRIP-MCNC: NEGATIVE MG/DL
RBC # BLD AUTO: 4.59 M/UL (ref 3.8–5.2)
RBC #/AREA URNS HPF: NORMAL /HPF (ref 0–5)
SODIUM SERPL-SCNC: 136 MMOL/L (ref 136–145)
SP GR UR REFRACTOMETRY: <1.005
TROPONIN I SERPL HS-MCNC: 4 NG/L (ref 0–51)
URINE CULTURE IF INDICATED: NORMAL
UROBILINOGEN UR QL STRIP.AUTO: 0.2 EU/DL (ref 0.2–1)
WBC # BLD AUTO: 5.4 K/UL (ref 3.6–11)
WBC URNS QL MICRO: NORMAL /HPF (ref 0–4)

## 2024-01-06 PROCEDURE — 84484 ASSAY OF TROPONIN QUANT: CPT

## 2024-01-06 PROCEDURE — 70498 CT ANGIOGRAPHY NECK: CPT

## 2024-01-06 PROCEDURE — 99285 EMERGENCY DEPT VISIT HI MDM: CPT

## 2024-01-06 PROCEDURE — 36415 COLL VENOUS BLD VENIPUNCTURE: CPT

## 2024-01-06 PROCEDURE — 2580000003 HC RX 258: Performed by: EMERGENCY MEDICINE

## 2024-01-06 PROCEDURE — 81025 URINE PREGNANCY TEST: CPT

## 2024-01-06 PROCEDURE — 6360000004 HC RX CONTRAST MEDICATION: Performed by: EMERGENCY MEDICINE

## 2024-01-06 PROCEDURE — 81001 URINALYSIS AUTO W/SCOPE: CPT

## 2024-01-06 PROCEDURE — 70450 CT HEAD/BRAIN W/O DYE: CPT

## 2024-01-06 PROCEDURE — 83690 ASSAY OF LIPASE: CPT

## 2024-01-06 PROCEDURE — 85025 COMPLETE CBC W/AUTO DIFF WBC: CPT

## 2024-01-06 PROCEDURE — 96360 HYDRATION IV INFUSION INIT: CPT

## 2024-01-06 PROCEDURE — 93005 ELECTROCARDIOGRAM TRACING: CPT | Performed by: EMERGENCY MEDICINE

## 2024-01-06 PROCEDURE — 80053 COMPREHEN METABOLIC PANEL: CPT

## 2024-01-06 RX ORDER — HYDROXYZINE HYDROCHLORIDE 25 MG/1
25 TABLET, FILM COATED ORAL EVERY 8 HOURS PRN
Qty: 20 TABLET | Refills: 0 | Status: SHIPPED | OUTPATIENT
Start: 2024-01-06 | End: 2024-01-16

## 2024-01-06 RX ORDER — HYOSCYAMINE SULFATE 0.12 MG/1
0.25 TABLET SUBLINGUAL EVERY 6 HOURS PRN
Qty: 120 EACH | Refills: 0 | Status: SHIPPED | OUTPATIENT
Start: 2024-01-06

## 2024-01-06 RX ORDER — 0.9 % SODIUM CHLORIDE 0.9 %
1000 INTRAVENOUS SOLUTION INTRAVENOUS ONCE
Status: COMPLETED | OUTPATIENT
Start: 2024-01-06 | End: 2024-01-06

## 2024-01-06 RX ADMIN — IOPAMIDOL 100 ML: 755 INJECTION, SOLUTION INTRAVENOUS at 02:59

## 2024-01-06 RX ADMIN — SODIUM CHLORIDE 1000 ML: 9 INJECTION, SOLUTION INTRAVENOUS at 03:20

## 2024-01-06 ASSESSMENT — PAIN SCALES - GENERAL: PAINLEVEL_OUTOF10: 10

## 2024-01-06 ASSESSMENT — PAIN DESCRIPTION - DESCRIPTORS: DESCRIPTORS: SHARP

## 2024-01-06 ASSESSMENT — LIFESTYLE VARIABLES
HOW MANY STANDARD DRINKS CONTAINING ALCOHOL DO YOU HAVE ON A TYPICAL DAY: PATIENT DOES NOT DRINK
HOW OFTEN DO YOU HAVE A DRINK CONTAINING ALCOHOL: NEVER

## 2024-01-06 ASSESSMENT — ENCOUNTER SYMPTOMS
NAUSEA: 0
SHORTNESS OF BREATH: 0
ABDOMINAL PAIN: 1
VOMITING: 0
DIARRHEA: 0

## 2024-01-06 ASSESSMENT — PAIN DESCRIPTION - LOCATION: LOCATION: CHEST

## 2024-01-06 ASSESSMENT — PAIN DESCRIPTION - ORIENTATION: ORIENTATION: ANTERIOR

## 2024-01-06 NOTE — ED NOTES
Patient ambulated to door from bed and back, approximately 6ft altogether. Patient able to ambulate without any assistance from ED staff however would not continue further from the bed due to dizziness. Patient able to move all limbs and admits to right side feeling better than her arrival. MD April updated on patient's ambulation trial.

## 2024-01-06 NOTE — ED NOTES
DC papers reviewed and in hand, pt verbalized understanding. Patient ambulatory out of ED with steady gait, no acute distress noted.

## 2024-01-06 NOTE — DISCHARGE INSTRUCTIONS
It was a pleasure taking care of you in our Emergency Department today.  We know that when you come to [facility], you are entrusting us with your health, comfort, and safety.  Our physicians and nurses honor that trust, and truly appreciate the opportunity to care for you and your loved ones.      We also value your feedback.  If you receive a survey about your Emergency Department experience today, please fill it out.  We care about our patients' feedback, and we listen to what you have to say.  Thank you!      Dr. Lay Chen MD.

## 2024-01-06 NOTE — ED PROVIDER NOTES
EMERGENCY DEPARTMENT HISTORY AND PHYSICAL EXAM     ----------------------------------------------------------------------------  Please note that this dictation was completed with [employer], the computer voice recognition software.  Quite often unanticipated grammatical, syntax, homophones, and other interpretive errors are inadvertently transcribed by the computer software.  Please disregard these errors.  Please excuse any errors that have escaped final proofreading  ----------------------------------------------------------------------------      Date: 1/6/2024  Patient Name: Debbie Toledo      HISTORY OF PRESENT ILLNESS     Chief Complaint   Patient presents with    Abdominal Pain    Chest Pain     Pt wheeled to triage, states abdominal pain, chest pain x 3 weeks-seen at [facility] x [facility] , dx with gastritis; states she became \"clammy\" this evening, prompting EMS call        History obtainted from:  Patient    Other independent source of history: none    HPI: Debbie Toledo is a 43 y.o. female, with significant pmhx of chronic knee pain, anemia who presents via private vehicle to the ED with c/o recurrent epigastric abdominal pain has been waxing and waning over the last several weeks with 3 [facility] for the same abdominal discomfort.  Patient notes that she had an episode this evening that caused her to hyperventilate and is now having right-sided paresthesias/tingling sensation all down her body.  Patient felt hot and clammy with a hyperventilation as well without nausea, vomiting or recent diarrhea.  Notes that she was told to stop taking Protonix due to a planned outpatient setting with GI.  Was supposed to have an appointment in February but was told yesterday by nurse navigator that she does not in fact have an appointment set up.      PCP: Lizette Martinez MD    Allergy List:   No Known Allergies      Medications:  Current Facility-Administered Medications   Medication Dose

## 2024-01-06 NOTE — ED NOTES
After triage when attempting to take patient to waiting room, patient states her entire right side suddenly feels weak.  Informed provider, who will evaluate in triage.

## 2024-09-02 ENCOUNTER — HOSPITAL ENCOUNTER (EMERGENCY)
Facility: HOSPITAL | Age: 44
Discharge: HOME | End: 2024-09-02
Attending: EMERGENCY MEDICINE
Payer: MEDICAID

## 2024-09-02 ENCOUNTER — APPOINTMENT (OUTPATIENT)
Facility: HOSPITAL | Age: 44
End: 2024-09-02
Payer: MEDICAID

## 2024-09-02 VITALS
RESPIRATION RATE: 18 BRPM | SYSTOLIC BLOOD PRESSURE: 117 MMHG | HEART RATE: 83 BPM | WEIGHT: 205.69 LBS | BODY MASS INDEX: 32.28 KG/M2 | HEIGHT: 67 IN | OXYGEN SATURATION: 99 % | TEMPERATURE: 98.5 F | DIASTOLIC BLOOD PRESSURE: 75 MMHG

## 2024-09-02 DIAGNOSIS — D25.9 UTERINE LEIOMYOMA, UNSPECIFIED LOCATION: Primary | ICD-10-CM

## 2024-09-02 DIAGNOSIS — R10.9 RIGHT SIDED ABDOMINAL PAIN: ICD-10-CM

## 2024-09-02 DIAGNOSIS — E87.6 HYPOKALEMIA: ICD-10-CM

## 2024-09-02 LAB
ALBUMIN SERPL-MCNC: 4.2 G/DL (ref 3.5–5)
ALBUMIN/GLOB SERPL: 1.1 (ref 1.1–2.2)
ALP SERPL-CCNC: 63 U/L (ref 45–117)
ALT SERPL-CCNC: 23 U/L (ref 12–78)
ANION GAP SERPL CALC-SCNC: 5 MMOL/L (ref 5–15)
APPEARANCE UR: CLEAR
AST SERPL-CCNC: 16 U/L (ref 15–37)
BACTERIA URNS QL MICRO: NEGATIVE /HPF
BASOPHILS # BLD: 0 K/UL (ref 0–0.1)
BASOPHILS NFR BLD: 0 % (ref 0–1)
BILIRUB SERPL-MCNC: 0.5 MG/DL (ref 0.2–1)
BILIRUB UR QL: NEGATIVE
BUN SERPL-MCNC: 7 MG/DL (ref 6–20)
BUN/CREAT SERPL: 7 (ref 12–20)
CALCIUM SERPL-MCNC: 8.8 MG/DL (ref 8.5–10.1)
CHLORIDE SERPL-SCNC: 106 MMOL/L (ref 97–108)
CO2 SERPL-SCNC: 27 MMOL/L (ref 21–32)
COLOR UR: NORMAL
COMMENT:: NORMAL
CREAT SERPL-MCNC: 0.94 MG/DL (ref 0.55–1.02)
DIFFERENTIAL METHOD BLD: ABNORMAL
EOSINOPHIL # BLD: 0.1 K/UL (ref 0–0.4)
EOSINOPHIL NFR BLD: 3 % (ref 0–7)
EPITH CASTS URNS QL MICRO: NORMAL /LPF
ERYTHROCYTE [DISTWIDTH] IN BLOOD BY AUTOMATED COUNT: 15.9 % (ref 11.5–14.5)
GLOBULIN SER CALC-MCNC: 3.7 G/DL (ref 2–4)
GLUCOSE SERPL-MCNC: 110 MG/DL (ref 65–100)
GLUCOSE UR STRIP.AUTO-MCNC: NEGATIVE MG/DL
HCG SERPL QL: NEGATIVE
HCG UR QL: NEGATIVE
HCT VFR BLD AUTO: 31.6 % (ref 35–47)
HGB BLD-MCNC: 10.4 G/DL (ref 11.5–16)
HGB UR QL STRIP: NEGATIVE
HYALINE CASTS URNS QL MICRO: NORMAL /LPF (ref 0–5)
IMM GRANULOCYTES # BLD AUTO: 0 K/UL (ref 0–0.04)
IMM GRANULOCYTES NFR BLD AUTO: 0 % (ref 0–0.5)
KETONES UR QL STRIP.AUTO: NEGATIVE MG/DL
LEUKOCYTE ESTERASE UR QL STRIP.AUTO: NEGATIVE
LIPASE SERPL-CCNC: 23 U/L (ref 13–75)
LYMPHOCYTES # BLD: 1.1 K/UL (ref 0.8–3.5)
LYMPHOCYTES NFR BLD: 23 % (ref 12–49)
MCH RBC QN AUTO: 24.9 PG (ref 26–34)
MCHC RBC AUTO-ENTMCNC: 32.9 G/DL (ref 30–36.5)
MCV RBC AUTO: 75.8 FL (ref 80–99)
MONOCYTES # BLD: 0.3 K/UL (ref 0–1)
MONOCYTES NFR BLD: 7 % (ref 5–13)
NEUTS SEG # BLD: 3.1 K/UL (ref 1.8–8)
NEUTS SEG NFR BLD: 67 % (ref 32–75)
NITRITE UR QL STRIP.AUTO: NEGATIVE
NRBC # BLD: 0 K/UL (ref 0–0.01)
NRBC BLD-RTO: 0 PER 100 WBC
PH UR STRIP: 6 (ref 5–8)
PLATELET # BLD AUTO: 248 K/UL (ref 150–400)
PMV BLD AUTO: 9.7 FL (ref 8.9–12.9)
POTASSIUM SERPL-SCNC: 3 MMOL/L (ref 3.5–5.1)
PROT SERPL-MCNC: 7.9 G/DL (ref 6.4–8.2)
PROT UR STRIP-MCNC: NEGATIVE MG/DL
RBC # BLD AUTO: 4.17 M/UL (ref 3.8–5.2)
RBC #/AREA URNS HPF: NORMAL /HPF (ref 0–5)
SODIUM SERPL-SCNC: 138 MMOL/L (ref 136–145)
SP GR UR REFRACTOMETRY: 1.01 (ref 1–1.03)
SPECIMEN HOLD: NORMAL
SPECIMEN HOLD: NORMAL
UROBILINOGEN UR QL STRIP.AUTO: 0.2 EU/DL (ref 0.2–1)
WBC # BLD AUTO: 4.7 K/UL (ref 3.6–11)
WBC URNS QL MICRO: NORMAL /HPF (ref 0–4)

## 2024-09-02 PROCEDURE — 6360000002 HC RX W HCPCS: Performed by: EMERGENCY MEDICINE

## 2024-09-02 PROCEDURE — 85025 COMPLETE CBC W/AUTO DIFF WBC: CPT

## 2024-09-02 PROCEDURE — 99285 EMERGENCY DEPT VISIT HI MDM: CPT

## 2024-09-02 PROCEDURE — 74177 CT ABD & PELVIS W/CONTRAST: CPT

## 2024-09-02 PROCEDURE — 96361 HYDRATE IV INFUSION ADD-ON: CPT

## 2024-09-02 PROCEDURE — 6370000000 HC RX 637 (ALT 250 FOR IP): Performed by: EMERGENCY MEDICINE

## 2024-09-02 PROCEDURE — 81025 URINE PREGNANCY TEST: CPT

## 2024-09-02 PROCEDURE — 80053 COMPREHEN METABOLIC PANEL: CPT

## 2024-09-02 PROCEDURE — 83690 ASSAY OF LIPASE: CPT

## 2024-09-02 PROCEDURE — 81001 URINALYSIS AUTO W/SCOPE: CPT

## 2024-09-02 PROCEDURE — 36415 COLL VENOUS BLD VENIPUNCTURE: CPT

## 2024-09-02 PROCEDURE — 76705 ECHO EXAM OF ABDOMEN: CPT

## 2024-09-02 PROCEDURE — 6360000004 HC RX CONTRAST MEDICATION: Performed by: RADIOLOGY

## 2024-09-02 PROCEDURE — 96374 THER/PROPH/DIAG INJ IV PUSH: CPT

## 2024-09-02 PROCEDURE — 84703 CHORIONIC GONADOTROPIN ASSAY: CPT

## 2024-09-02 PROCEDURE — 2580000003 HC RX 258: Performed by: EMERGENCY MEDICINE

## 2024-09-02 RX ORDER — IBUPROFEN 600 MG/1
600 TABLET, FILM COATED ORAL EVERY 8 HOURS PRN
Qty: 30 TABLET | Refills: 0 | Status: SHIPPED | OUTPATIENT
Start: 2024-09-02

## 2024-09-02 RX ORDER — POTASSIUM CHLORIDE 750 MG/1
20 TABLET, EXTENDED RELEASE ORAL ONCE
Status: COMPLETED | OUTPATIENT
Start: 2024-09-02 | End: 2024-09-02

## 2024-09-02 RX ORDER — IOPAMIDOL 755 MG/ML
100 INJECTION, SOLUTION INTRAVASCULAR
Status: COMPLETED | OUTPATIENT
Start: 2024-09-02 | End: 2024-09-02

## 2024-09-02 RX ORDER — 0.9 % SODIUM CHLORIDE 0.9 %
1000 INTRAVENOUS SOLUTION INTRAVENOUS ONCE
Status: COMPLETED | OUTPATIENT
Start: 2024-09-02 | End: 2024-09-02

## 2024-09-02 RX ORDER — KETOROLAC TROMETHAMINE 30 MG/ML
15 INJECTION, SOLUTION INTRAMUSCULAR; INTRAVENOUS
Status: COMPLETED | OUTPATIENT
Start: 2024-09-02 | End: 2024-09-02

## 2024-09-02 RX ORDER — POLYETHYLENE GLYCOL 3350 17 G/17G
17 POWDER, FOR SOLUTION ORAL DAILY
Qty: 1530 G | Refills: 1 | Status: SHIPPED | OUTPATIENT
Start: 2024-09-02 | End: 2024-10-02

## 2024-09-02 RX ADMIN — POTASSIUM CHLORIDE 20 MEQ: 750 TABLET, EXTENDED RELEASE ORAL at 12:45

## 2024-09-02 RX ADMIN — IOPAMIDOL 100 ML: 755 INJECTION, SOLUTION INTRAVENOUS at 12:08

## 2024-09-02 RX ADMIN — KETOROLAC TROMETHAMINE 15 MG: 30 INJECTION, SOLUTION INTRAMUSCULAR at 11:12

## 2024-09-02 RX ADMIN — SODIUM CHLORIDE 1000 ML: 9 INJECTION, SOLUTION INTRAVENOUS at 11:12

## 2024-09-02 ASSESSMENT — ENCOUNTER SYMPTOMS
ABDOMINAL PAIN: 1
SHORTNESS OF BREATH: 0
ANAL BLEEDING: 0
NAUSEA: 0
VOMITING: 0
ABDOMINAL DISTENTION: 0
COUGH: 0
DIARRHEA: 0
BLOOD IN STOOL: 0

## 2024-09-02 ASSESSMENT — PAIN DESCRIPTION - LOCATION: LOCATION: ABDOMEN;BACK

## 2024-09-02 ASSESSMENT — PAIN DESCRIPTION - DIRECTION: RADIATING_TOWARDS: R LOWER BACK

## 2024-09-02 ASSESSMENT — PAIN DESCRIPTION - ORIENTATION: ORIENTATION: RIGHT

## 2024-09-02 ASSESSMENT — PAIN DESCRIPTION - PAIN TYPE: TYPE: ACUTE PAIN

## 2024-09-02 ASSESSMENT — PAIN - FUNCTIONAL ASSESSMENT
PAIN_FUNCTIONAL_ASSESSMENT: ACTIVITIES ARE NOT PREVENTED
PAIN_FUNCTIONAL_ASSESSMENT: 0-10

## 2024-09-02 ASSESSMENT — PAIN SCALES - GENERAL: PAINLEVEL_OUTOF10: 6

## 2024-09-02 ASSESSMENT — PAIN DESCRIPTION - FREQUENCY: FREQUENCY: CONTINUOUS

## 2024-09-02 ASSESSMENT — PAIN DESCRIPTION - ONSET: ONSET: GRADUAL

## 2024-09-02 ASSESSMENT — PAIN DESCRIPTION - DESCRIPTORS: DESCRIPTORS: ACHING;CRUSHING

## 2024-09-02 NOTE — ED TRIAGE NOTES
Pt arrives ambulatory to triage snow c/o R sided ab pain that radiates to lower back. Pt denies n/v/d.

## 2024-09-02 NOTE — ED PROVIDER NOTES
[facility] EMERGENCY DEP  EMERGENCY DEPARTMENT ENCOUNTER      Pt Name: Debbie Toledo  MRN: [id number]  Birthdate 1980  Date of evaluation: 9/2/2024  Provider: Randall Funes MD    CHIEF COMPLAINT       Chief Complaint   Patient presents with    Abdominal Pain    Back Pain         HISTORY OF PRESENT ILLNESS   (Location/Symptom, Timing/Onset, Context/Setting, Quality, Duration, Modifying Factors, Severity)  Note limiting factors.   43F w/ hx anemia, GERD and esophageal diverticula p/w 1d abd pain. Pt reports 1d constant, RUQ abd pain radiating to her back. Symptoms started after ate a meal cooked w/ \"extra olive oil.\" No F/C, N/V/D, rectal bleeding. Notes dysuria as well. No abd wall rash or trauma. No CP. Has chronic but unchanged SOB. No prior abd surgeries.            Review of External Medical Records:     Nursing Notes were reviewed.    REVIEW OF SYSTEMS    (2-9 systems for level 4, 10 or more for level 5)     Review of Systems   Constitutional:  Negative for diaphoresis and fever.   HENT:  Negative for nosebleeds.    Eyes:  Negative for visual disturbance.   Respiratory:  Negative for cough and shortness of breath.    Cardiovascular:  Negative for chest pain, palpitations and leg swelling.   Gastrointestinal:  Positive for abdominal pain. Negative for abdominal distention, anal bleeding, blood in stool, diarrhea, nausea and vomiting.   Endocrine: Negative for polyuria.   Genitourinary:  Negative for difficulty urinating, dysuria, frequency and hematuria.   Musculoskeletal:  Negative for joint swelling.   Skin:  Negative for wound.   Allergic/Immunologic: Negative for immunocompromised state.   Neurological:  Negative for seizures and syncope.   Hematological:  Does not bruise/bleed easily.   Psychiatric/Behavioral:  Negative for confusion.        Except as noted above the remainder of the review of systems was reviewed and negative.       PAST MEDICAL HISTORY     Past Medical History:   Diagnosis Date